# Patient Record
Sex: MALE | Race: WHITE | Employment: OTHER | URBAN - METROPOLITAN AREA
[De-identification: names, ages, dates, MRNs, and addresses within clinical notes are randomized per-mention and may not be internally consistent; named-entity substitution may affect disease eponyms.]

---

## 2019-03-12 ENCOUNTER — HOSPITAL ENCOUNTER (OUTPATIENT)
Dept: VASCULAR LAB | Age: 85
Discharge: HOME | End: 2019-03-12
Payer: MEDICARE

## 2019-03-12 PROCEDURE — 93975 VASCULAR STUDY: CPT

## 2019-05-09 ENCOUNTER — HOSPITAL ENCOUNTER (OUTPATIENT)
Dept: CT IMAGING | Age: 85
Discharge: HOME | End: 2019-05-09
Payer: MEDICARE

## 2019-05-09 DIAGNOSIS — I10 ESSENTIAL HYPERTENSION, MALIGNANT: ICD-10-CM

## 2019-05-09 PROCEDURE — 74150 CT ABDOMEN W/O CONTRAST: CPT

## 2019-06-01 ENCOUNTER — HOSPITAL ENCOUNTER (EMERGENCY)
Age: 85
Discharge: HOME | End: 2019-06-01
Attending: EMERGENCY MEDICINE
Payer: MEDICARE

## 2019-06-01 VITALS
WEIGHT: 178.13 LBS | OXYGEN SATURATION: 98 % | HEIGHT: 71 IN | DIASTOLIC BLOOD PRESSURE: 82 MMHG | RESPIRATION RATE: 18 BRPM | HEART RATE: 60 BPM | TEMPERATURE: 98 F | SYSTOLIC BLOOD PRESSURE: 136 MMHG | BODY MASS INDEX: 24.94 KG/M2

## 2019-06-01 DIAGNOSIS — E87.5 CHRONIC HYPERKALEMIA: Primary | ICD-10-CM

## 2019-06-01 LAB
A/G RATIO: 1.3 (ref 1.1–2.2)
ALBUMIN SERPL-MCNC: 3.7 G/DL (ref 3.4–5)
ALP BLD-CCNC: 46 U/L (ref 40–129)
ALT SERPL-CCNC: 25 U/L (ref 10–40)
ANION GAP SERPL CALCULATED.3IONS-SCNC: 9 MMOL/L (ref 3–16)
AST SERPL-CCNC: 18 U/L (ref 15–37)
BASE EXCESS ARTERIAL: -1.2 MMOL/L (ref -3–3)
BASOPHILS ABSOLUTE: 0 K/UL (ref 0–0.2)
BASOPHILS RELATIVE PERCENT: 0.5 %
BILIRUB SERPL-MCNC: 0.7 MG/DL (ref 0–1)
BUN BLDV-MCNC: 26 MG/DL (ref 7–20)
CALCIUM SERPL-MCNC: 9.8 MG/DL (ref 8.3–10.6)
CARBOXYHEMOGLOBIN ARTERIAL: 1.1 % (ref 0–1.5)
CHLORIDE BLD-SCNC: 103 MMOL/L (ref 99–110)
CO2: 23 MMOL/L (ref 21–32)
CREAT SERPL-MCNC: 1.6 MG/DL (ref 0.8–1.3)
EOSINOPHILS ABSOLUTE: 0.2 K/UL (ref 0–0.6)
EOSINOPHILS RELATIVE PERCENT: 2.5 %
GFR AFRICAN AMERICAN: 50
GFR NON-AFRICAN AMERICAN: 41
GLOBULIN: 2.8 G/DL
GLUCOSE BLD-MCNC: 99 MG/DL (ref 70–99)
HCO3 ARTERIAL: 20.9 MMOL/L (ref 21–29)
HCT VFR BLD CALC: 38.4 % (ref 40.5–52.5)
HEMOGLOBIN, ART, EXTENDED: 13.5 G/DL (ref 13.5–17.5)
HEMOGLOBIN: 13.1 G/DL (ref 13.5–17.5)
LYMPHOCYTES ABSOLUTE: 1.4 K/UL (ref 1–5.1)
LYMPHOCYTES RELATIVE PERCENT: 18.1 %
MCH RBC QN AUTO: 34.2 PG (ref 26–34)
MCHC RBC AUTO-ENTMCNC: 34 G/DL (ref 31–36)
MCV RBC AUTO: 100.6 FL (ref 80–100)
METHEMOGLOBIN ARTERIAL: 0.9 %
MONOCYTES ABSOLUTE: 0.5 K/UL (ref 0–1.3)
MONOCYTES RELATIVE PERCENT: 7.1 %
NEUTROPHILS ABSOLUTE: 5.4 K/UL (ref 1.7–7.7)
NEUTROPHILS RELATIVE PERCENT: 71.8 %
O2 CONTENT ARTERIAL: 19 ML/DL
O2 SAT, ARTERIAL: 98.7 %
O2 THERAPY: ABNORMAL
PCO2 ARTERIAL: 28.9 MMHG (ref 35–45)
PDW BLD-RTO: 13.7 % (ref 12.4–15.4)
PH ARTERIAL: 7.47 (ref 7.35–7.45)
PLATELET # BLD: 176 K/UL (ref 135–450)
PMV BLD AUTO: 6.6 FL (ref 5–10.5)
PO2 ARTERIAL: 104 MMHG (ref 75–108)
POTASSIUM REFLEX MAGNESIUM: 5.8 MMOL/L (ref 3.5–5.1)
RBC # BLD: 3.82 M/UL (ref 4.2–5.9)
SODIUM BLD-SCNC: 135 MMOL/L (ref 136–145)
TCO2 ARTERIAL: 21.8 MMOL/L
TOTAL PROTEIN: 6.5 G/DL (ref 6.4–8.2)
WBC # BLD: 7.5 K/UL (ref 4–11)

## 2019-06-01 PROCEDURE — 80053 COMPREHEN METABOLIC PANEL: CPT

## 2019-06-01 PROCEDURE — 93005 ELECTROCARDIOGRAM TRACING: CPT | Performed by: EMERGENCY MEDICINE

## 2019-06-01 PROCEDURE — 85025 COMPLETE CBC W/AUTO DIFF WBC: CPT

## 2019-06-01 PROCEDURE — 99283 EMERGENCY DEPT VISIT LOW MDM: CPT

## 2019-06-01 PROCEDURE — 94762 N-INVAS EAR/PLS OXIMTRY CONT: CPT

## 2019-06-01 PROCEDURE — 82803 BLOOD GASES ANY COMBINATION: CPT

## 2019-06-01 PROCEDURE — 36600 WITHDRAWAL OF ARTERIAL BLOOD: CPT

## 2019-06-01 PROCEDURE — 36415 COLL VENOUS BLD VENIPUNCTURE: CPT

## 2019-06-01 RX ORDER — SODIUM POLYSTYRENE SULFONATE 15 G/60ML
5 SUSPENSION ORAL; RECTAL DAILY
Qty: 140 ML | Refills: 0 | Status: SHIPPED | OUTPATIENT
Start: 2019-06-01 | End: 2019-06-08

## 2019-06-01 NOTE — ED NOTES
[facility]  Respiratory Therapy  Arterial Blood Gas    Name:  Naya Maradiaga  Medical Record Number:  [id number]  Age: 81 y.o.   : 1932  Today's Date:  2019  Room:  [address] [place] [address]      Assessment      BP (!) 148/74   Pulse 64   Temp 97.7 °F (36.5 °C) (Oral)   Resp 19   Ht 5' 11\" (1.803 m)   Wt 178 lb 2.1 oz (80.8 kg)   SpO2 98%   BMI 24.84 kg/m²     Patient Active Problem List   Diagnosis    Cellulitis    Near syncope    Left leg weakness    HTN (hypertension)       Social History  Social History     Tobacco Use    Smoking status: Never Smoker   Substance Use Topics    Alcohol use: No    Drug use: No         ABG drawn from right radial site. Current O2 settings: room air. An Michael's test was performed prior to draw and was positive for collateral blood flow. The draw site was then prepped with an alcohol pad and the specimen was collected on the first attempt. Pressure was held from the puncture site until any signs of bleeding had stopped and the site was bandaged with a bandaid. The specimen was then sent to the lab for analysis.         Patient/caregiver was educated on the proper method of use:  Yes      Level of patient/caregiver understanding able to:   [] Verbalize understanding   [] Demonstrate understanding       [] Teach back        [] Needs reinforcement        []  No available caregiver               []  Other:     Response to education:  Very Good     Teaching Time:  5  minutes     Electronically signed by Gisele Pantoja RCP on 2019 at 10:37 AM

## 2019-06-01 NOTE — ED TRIAGE NOTES
Pt to room 34 pt was seen by pcp and told to come in for high potassium level.  Pt has not complaints at this time pt pcp told him to stop taking spiractone and take a dose of lasix this am.

## 2019-06-01 NOTE — ED NOTES
D/C: Order noted for d/c. Pt confirmed d/c paperwork  have correct name. Discharge and education instructions reviewed with patient. Teach-back successful. Pt verbalized understanding and signed d/c papers. Pt denied questions at this time. No acute distress noted. Patient instructed to follow-up as noted - return to emergency department if symptoms worsen. Patient verbalized understanding. Discharged per EDMD with discharge instructions. Pt discharged per ambulation to private vehicle. Patient stable upon departure. Thanked patient for choosing [facility]) for care.           Ilene Brand RN  06/01/19 1200

## 2019-06-01 NOTE — ED PROVIDER NOTES
Emergency Physician Note    Chief Complaint  Other (pt sent by pcp for elevated potassium levels )       History of Present Illness  Sharon Meyer is a 81 y.o. male who presents to the ED for elevated potassium. Patient has chronic kidney disease is currently under the care of a nephrologist.  He had some labs done yesterday and it was indicated that it was higher than usual and he recommended that he go to the ER. At this time patient has no complaints. That he feels so good that he's been doing his own yard work recently. Patient was initially on spironolactone 50 mg twice a day, iit was recently stopped by his physician and he was started on  Lasix which she received his first dose this morning. Denies fever, chills, malaise, chest pain, shortness of breath, cough, abdominal pain, nausea, vomiting, diarrhea, headache, sore throat, dysuria, back pain, rash. No palliative/provocative factors. Unless otherwise stated in this report or unable to obtain because of the patient's clinical or mental status as evidenced by the medical record, this patient's positive and negative responses for review of systems, constitutional, psych, eyes, ENT, cardiovascular, respiratory, gastrointestinal, neurological, genitourinary, musculoskeletal, integument systems and systems related to the presenting problem are either stated in the preceding paragraph or were not pertinent or were negative for the symptoms and/or complaints related to the medical problem. I have reviewed the following from the nursing documentation:      Prior to Admission medications    Medication Sig Start Date End Date Taking? Authorizing Provider   furosemide (LASIX) 40 MG tablet Take 1 tablet by mouth daily For 7 days. Then take 1 tab by mouth once daily onwards.  8/4/15   Brooke Rhodes MD   amLODIPine [facility]) 5 MG tablet Take 1 tablet by mouth daily 8/4/15   Brooke Rhodes MD   ALPRAZolam [facility]) 0.5 MG tablet Take 0.5 mg by mouth 3 times daily    Historical Provider, MD   dicyclomine (BENTYL) 10 MG capsule Take 10 mg by mouth 3 times daily    Historical Provider, MD   sertraline (ZOLOFT) 100 MG tablet   Take 150 mg by mouth daily     Historical Provider, MD   finasteride (PROSCAR) 5 MG tablet Take 5 mg by mouth daily    Historical Provider, MD   oxybutynin (DITROPAN) 5 MG tablet Take 5 mg by mouth 3 times daily    Historical Provider, MD   benazepril (LOTENSIN) 40 MG tablet Take 40 mg by mouth daily    Historical Provider, MD   bisoprolol-hydrochlorothiazide (ZIAC) 10-6.25 MG per tablet Take 1 tablet by mouth daily    Historical Provider, MD   aspirin 81 MG tablet Take 81 mg by mouth daily    Historical Provider, MD   Multiple Vitamins-Minerals (CENTRUM SILVER ADULT 50+ PO) Take 1 tablet by mouth daily    Historical Provider, MD   loperamide (IMODIUM) 2 MG capsule Take 2 mg by mouth 4 times daily as needed for Diarrhea    Historical Provider, MD       Allergies as of 06/01/2019    (No Known Allergies)       Past Medical History:   Diagnosis Date    Anxiety     BPH (benign prostatic hyperplasia)     Chronic kidney disease     GERD (gastroesophageal reflux disease)     Hypertension     IBS (irritable bowel syndrome)     Psychiatric problem         Surgical History:   Past Surgical History:   Procedure Laterality Date    HERNIA REPAIR      TONSILLECTOMY          Family History:    Family History   Problem Relation Age of Onset    Heart Disease Mother     Heart Disease Father     Other Brother        Social History     Socioeconomic History    Marital status:       Spouse name: Not on file    Number of children: Not on file    Years of education: Not on file    Highest education level: Not on file   Occupational History    Not on file   Social Needs    Financial resource strain: Not on file    Food insecurity:     Worry: Not on file     Inability: Not on file    Transportation needs:     Medical: Not on file Non-medical: Not on file   Tobacco Use    Smoking status: Never Smoker   Substance and Sexual Activity    Alcohol use: No    Drug use: No    Sexual activity: Not on file   Lifestyle    Physical activity:     Days per week: Not on file     Minutes per session: Not on file    Stress: Not on file   Relationships    Social connections:     Talks on phone: Not on file     Gets together: Not on file     Attends [employer] service: Not on file     Active member of club or organization: Not on file     Attends meetings of clubs or organizations: Not on file     Relationship status: Not on file    Intimate partner violence:     Fear of current or ex partner: Not on file     Emotionally abused: Not on file     Physically abused: Not on file     Forced sexual activity: Not on file   Other Topics Concern    Not on file   Social History Narrative    Not on file       Nursing notes reviewed. ED Triage Vitals [06/01/19 0940]   Enc Vitals Group      BP (!) 148/74      Pulse 64      Resp 18      Temp 97.7 °F (36.5 °C)      Temp Source Oral      SpO2 100 %      Weight 178 lb 2.1 oz (80.8 kg)      Height 5' 11\" (1.803 m)      Head Circumference       Peak Flow       Pain Score       Pain Loc       Pain Edu? Excl. in [address]? GENERAL:  Awake, alert. Well developed, well nourished with no apparent distress. HENT:  Normocephalic, Atraumatic, no lacerations. EYES:  Conjunctiva normal, Pupils equal round and reactive to light, extraocular movements normal.  NECK:  Trachea is midline. No stridor. CHEST:  Regular rate and regular rhythm, no murmurs/rubs/gallops, normal S1/S2, chest wall non-tender. LUNGS:  No respiratory distress. No abdominal retractions, no sternal retractions. Clear to auscultation bilaterally, no wheezing, no rhochi, no rales  ABDOMEN:  Soft, non-tender, non-distended. No guarding and no rebound. No costovertebral angle tenderness to palpation.  Normal BS, no organomegaly, no abdominal masses  EXTREMITIES:  Normal range of motion, no edema, no tenderness, no deformity, distal pulses present and equal bilaterally. Moves extremities x4 with purpose. SKIN: Warm, dry and intact. NEUROLOGIC: Normal mental status. Moving all extremities to command. Alert and oriented x4 without focal deficit or gross sensory deficit. Normal speech. Strength 5/5 in bilateral upper and lower extremities. PSYCHIATRIC: Not anxious, normal mood and affect, thoughts are linear and organized, without delusions/hallucinations, responds appropriately to questions      LABS and DIAGNOSTIC RESULTS  EKG  The Ekg interpreted by me shows  normal sinus rhythm and and RBBB with a rate of 60  Axis is   Left axis deviation  QTc is  normal  Intervals and Durations are unremarkable. ST Segments: normal  Delta waves, Brugada Syndrome, and Short CO are not present. Prior EKG to compare with was available.  No significant changes compared to prior EKG from 31-JUL-2015    LABS  Results for orders placed or performed during the hospital encounter of 06/01/19   CBC Auto Differential   Result Value Ref Range    WBC 7.5 4.0 - 11.0 K/uL    RBC 3.82 (L) 4.20 - 5.90 M/uL    Hemoglobin 13.1 (L) 13.5 - 17.5 g/dL    Hematocrit 38.4 (L) 40.5 - 52.5 %    .6 (H) 80.0 - 100.0 fL    MCH 34.2 (H) 26.0 - 34.0 pg    MCHC 34.0 31.0 - 36.0 g/dL    RDW 13.7 12.4 - 15.4 %    Platelets 595 084 - 215 K/uL    MPV 6.6 5.0 - 10.5 fL    Neutrophils % 71.8 %    Lymphocytes % 18.1 %    Monocytes % 7.1 %    Eosinophils % 2.5 %    Basophils % 0.5 %    Neutrophils # 5.4 1.7 - 7.7 K/uL    Lymphocytes # 1.4 1.0 - 5.1 K/uL    Monocytes # 0.5 0.0 - 1.3 K/uL    Eosinophils # 0.2 0.0 - 0.6 K/uL    Basophils # 0.0 0.0 - 0.2 K/uL   Comprehensive Metabolic Panel w/ Reflex to MG   Result Value Ref Range    Sodium 135 (L) 136 - 145 mmol/L    Potassium reflex Magnesium 5.8 (H) 3.5 - 5.1 mmol/L    Chloride 103 99 - 110 mmol/L    CO2 23 21 - 32 mmol/L    Anion Gap 9 3 - 16    Glucose 99 70 - 99 mg/dL    BUN 26 (H) 7 - 20 mg/dL    CREATININE 1.6 (H) 0.8 - 1.3 mg/dL    GFR Non- 41 (A) >60    GFR  50 (A) >60    Calcium 9.8 8.3 - 10.6 mg/dL    Total Protein 6.5 6.4 - 8.2 g/dL    Alb 3.7 3.4 - 5.0 g/dL    Albumin/Globulin Ratio 1.3 1.1 - 2.2    Total Bilirubin 0.7 0.0 - 1.0 mg/dL    Alkaline Phosphatase 46 40 - 129 U/L    ALT 25 10 - 40 U/L    AST 18 15 - 37 U/L    Globulin 2.8 g/dL   Blood Gas, Arterial   Result Value Ref Range    pH, Arterial 7.473 (H) 7.350 - 7.450    pCO2, Arterial 28.9 (L) 35.0 - 45.0 mmHg    pO2, Arterial 104.0 75.0 - 108.0 mmHg    HCO3, Arterial 20.9 (L) 21.0 - 29.0 mmol/L    Base Excess, Arterial -1.2 -3.0 - 3.0 mmol/L    Hemoglobin, Art, Extended 13.5 13.5 - 17.5 g/dL    O2 Sat, Arterial 98.7 >92 %    Carboxyhgb, Arterial 1.1 0.0 - 1.5 %    Methemoglobin, Arterial 0.9 <1.5 %    TCO2, Arterial 21.8 Not Established mmol/L    O2 Content, Arterial 19 Not Established mL/dL    O2 Therapy See comment      I reviewed his previous potassium levels, On May 9  It was 5.6, yesterday was 6.5 today it is 5.8        PROCEDURES      MEDICAL DECISION MAKING    Procedures/interventions/images ordered for this visit  Orders Placed This Encounter   Procedures    CBC Auto Differential    Comprehensive Metabolic Panel w/ Reflex to MG    Blood Gas, Arterial    EKG 12 Lead    Saline lock IV    Insert peripheral IV       Medications ordered for this visit  No orders of the defined types were placed in this encounter. I discussed today's presentation patient and history with Dr. Clari Chaudhry, nephrologist, he recommended starting the patient on Kayexalate at 5 g daily and follow-up with his nephrologist on Monday. Patient daughter was in the room with him and took notes during my conversation with the patient and his daughter. This is a very pleasant patient who presents to the ER with abnormal lab values.   There are no focal findings on exam, they are afebrile, well appearing and have stable v/s. The exam and limited diagnostic studies performed in the emergency department today do not point towards acute coronary syndrome, pulmonary embolism, toxicity, shock, sepsis, unstable arrhythmia, hemodynamic or cardiopulmonary instability, severe anemia, or any disease process requiring other immediate medical or surgical intervention at this time. It is understood that close follow up with their physician is indicated and if they are not improving as expected or if other new symptoms or signs of concern develop, other etiologies or diagnoses may need to be considered requiring other tests, treatments, consultations, and/or admission. If any symptom should worsen or change then they are to return to the ER immediately. The diagnosis, plan, expected course, follow-up, and return precautions were discussed and all questions were answered. Final Impression    1. Chronic hyperkalemia        Blood pressure 136/82, pulse 60, temperature 98 °F (36.7 °C), temperature source Oral, resp. rate 18, height 5' 11\" (1.803 m), weight 178 lb 2.1 oz (80.8 kg), SpO2 98 %. Patient and/or companions verbalized understanding of the ED workup, any relevant findings as well as any incidental findings, and the disposition and plan. Questions sought and answered with the patient and/or family members. They voice understanding and agree with plan. If the patient was discharged from the ED, they were instructed to return for any worsening or worrisome concerns. Patient was given scripts for the following medications. I counseled patient how to take these medications. New Prescriptions    No medications on file       Disposition  Pt is in stable condition upon Discharge to home. The note was completed using Dragon voice recognition transcription.  Every effort was made to ensure accuracy; however, inadvertent transcription errors may be present despite my best efforts

## 2019-06-02 LAB
EKG ATRIAL RATE: 60 BPM
EKG DIAGNOSIS: NORMAL
EKG P AXIS: 82 DEGREES
EKG P-R INTERVAL: 208 MS
EKG Q-T INTERVAL: 440 MS
EKG QRS DURATION: 126 MS
EKG QTC CALCULATION (BAZETT): 440 MS
EKG R AXIS: -10 DEGREES
EKG T AXIS: 4 DEGREES
EKG VENTRICULAR RATE: 60 BPM

## 2019-06-02 PROCEDURE — 93010 ELECTROCARDIOGRAM REPORT: CPT | Performed by: INTERNAL MEDICINE

## 2024-12-02 ENCOUNTER — HOSPITAL ENCOUNTER (OUTPATIENT)
Dept: WOUND CARE | Age: 89
Discharge: HOME | End: 2024-12-02
Attending: NURSE PRACTITIONER
Payer: MEDICARE

## 2024-12-02 VITALS
HEIGHT: 71 IN | WEIGHT: 131.17 LBS | HEART RATE: 71 BPM | RESPIRATION RATE: 20 BRPM | SYSTOLIC BLOOD PRESSURE: 128 MMHG | BODY MASS INDEX: 18.36 KG/M2 | TEMPERATURE: 98.2 F | DIASTOLIC BLOOD PRESSURE: 70 MMHG

## 2024-12-02 DIAGNOSIS — L89.322 PRESSURE ULCER OF LEFT ISCHIUM, STAGE 2 (HCC): Primary | ICD-10-CM

## 2024-12-02 PROCEDURE — 99213 OFFICE O/P EST LOW 20 MIN: CPT

## 2024-12-02 PROCEDURE — 11042 DBRDMT SUBQ TIS 1ST 20SQCM/<: CPT | Performed by: NURSE PRACTITIONER

## 2024-12-02 PROCEDURE — 11042 DBRDMT SUBQ TIS 1ST 20SQCM/<: CPT

## 2024-12-02 RX ORDER — GENTAMICIN SULFATE 1 MG/G
OINTMENT TOPICAL ONCE
OUTPATIENT
Start: 2024-12-02 | End: 2024-12-02

## 2024-12-02 RX ORDER — LIDOCAINE HYDROCHLORIDE 40 MG/ML
SOLUTION TOPICAL ONCE
OUTPATIENT
Start: 2024-12-02 | End: 2024-12-02

## 2024-12-02 RX ORDER — BETAMETHASONE DIPROPIONATE 0.5 MG/G
CREAM TOPICAL ONCE
OUTPATIENT
Start: 2024-12-02 | End: 2024-12-02

## 2024-12-02 RX ORDER — MUPIROCIN 20 MG/G
OINTMENT TOPICAL ONCE
OUTPATIENT
Start: 2024-12-02 | End: 2024-12-02

## 2024-12-02 RX ORDER — GINSENG 100 MG
CAPSULE ORAL ONCE
OUTPATIENT
Start: 2024-12-02 | End: 2024-12-02

## 2024-12-02 RX ORDER — LIDOCAINE HYDROCHLORIDE 20 MG/ML
JELLY TOPICAL ONCE
OUTPATIENT
Start: 2024-12-02 | End: 2024-12-02

## 2024-12-02 RX ORDER — SODIUM CHLOR/HYPOCHLOROUS ACID 0.033 %
SOLUTION, IRRIGATION IRRIGATION ONCE
OUTPATIENT
Start: 2024-12-02 | End: 2024-12-02

## 2024-12-02 RX ORDER — LIDOCAINE HYDROCHLORIDE 40 MG/ML
SOLUTION TOPICAL ONCE
Status: DISCONTINUED | OUTPATIENT
Start: 2024-12-02 | End: 2024-12-03 | Stop reason: HOSPADM

## 2024-12-02 RX ORDER — LIDOCAINE 40 MG/G
CREAM TOPICAL ONCE
OUTPATIENT
Start: 2024-12-02 | End: 2024-12-02

## 2024-12-02 RX ORDER — LIDOCAINE 50 MG/G
OINTMENT TOPICAL ONCE
OUTPATIENT
Start: 2024-12-02 | End: 2024-12-02

## 2024-12-02 RX ORDER — SILVER SULFADIAZINE 10 MG/G
CREAM TOPICAL ONCE
OUTPATIENT
Start: 2024-12-02 | End: 2024-12-02

## 2024-12-02 RX ORDER — TRIAMCINOLONE ACETONIDE 1 MG/G
OINTMENT TOPICAL ONCE
OUTPATIENT
Start: 2024-12-02 | End: 2024-12-02

## 2024-12-02 RX ORDER — NEOMYCIN/BACITRACIN/POLYMYXINB 3.5-400-5K
OINTMENT (GRAM) TOPICAL ONCE
OUTPATIENT
Start: 2024-12-02 | End: 2024-12-02

## 2024-12-02 RX ORDER — CLOBETASOL PROPIONATE 0.5 MG/G
OINTMENT TOPICAL ONCE
OUTPATIENT
Start: 2024-12-02 | End: 2024-12-02

## 2024-12-02 RX ORDER — BACITRACIN ZINC AND POLYMYXIN B SULFATE 500; 1000 [USP'U]/G; [USP'U]/G
OINTMENT TOPICAL ONCE
OUTPATIENT
Start: 2024-12-02 | End: 2024-12-02

## 2024-12-02 RX ORDER — CARVEDILOL 6.25 MG/1
6.25 TABLET ORAL 2 TIMES DAILY WITH MEALS
COMMUNITY

## 2024-12-02 RX ORDER — PATIROMER 8.4 G/1
8.4 POWDER, FOR SUSPENSION ORAL DAILY
COMMUNITY

## 2024-12-02 RX ORDER — HYDROPHILIC CREAM
PASTE (GRAM) TOPICAL
Qty: 170 G | Refills: 1 | Status: SHIPPED | OUTPATIENT
Start: 2024-12-02

## 2024-12-02 ASSESSMENT — PAIN SCALES - GENERAL: PAINLEVEL_OUTOF10: 0

## 2024-12-02 NOTE — PATIENT INSTRUCTIONS
[facility] Wound Care Physician Orders and Discharge Instructions  [address], [address]        [place], [place] [address]  Telephone: [phone]      FAX [phone]  MONDAY - THURSDAY 8:30 am - 4:30 pm and Friday 8:30 am - 11:30 am      NAME:  Ran Solis  YOB: 1932  MEDICAL RECORD NUMBER:  [id number]  DATE:  12/2/2024      Return Appointment:  [x] Return Appointment: With Brandon Mitchell CNP  in  1  Week(s)             [] Nurse Visit for Wound Assessment:  [] DME/Wound Dressing Supplies Provided by:    Please call them directly to reorder supplies when you run out)  [] ECF or Home Healthcare:  Other: n/a  Your Home Care Agency is responsible to order your supplies.   [] Orders placed during your visit:       Memory Foam recommendations for bottoms wounds.   Memory Foam cushions 3-4 in thick. Sold at medical supply stores and online.   Range from $20- 45 Dollars    [employer] [facility] 1 Cushion with Higher Density for Additional Support, 18 Inch X 16 Inch X 4 Inch          Triad Cream: Sold Over the Counter and online    $15-30 Dollars     Triad is stocked at our local [facility] pharmacy   If looking for Zinc Oxide, it has several percentages, we recommended 20%- 40%    MAY PURCHASE OVER THE COUNTER DESITIN             Important Reminders:   Please wash hands with soap and water before and after every dressing change.  If you smoke we ask that you refrain from smoking. Smoking inhibits wounds from healing.  When taking antibiotics take the entire prescription as ordered. Do not stop taking until medication is all gone unless otherwise instructed.   Do not get wounds wet in the bath or shower unless otherwise instructed by your physician. If your wound is on your foot or leg, you may purchase a cast bag. Please ask at your local pharmacy.   IF YOU ARE UNABLE TO OBTAIN WOUND SUPPLIES, CONTINUE TO USE THE SUPPLIES YOU HAVE AVAILABLE UNTIL YOU ARE ABLE TO REACH US.

## 2024-12-03 ENCOUNTER — TELEPHONE (OUTPATIENT)
Dept: WOUND CARE | Age: 89
End: 2024-12-03

## 2024-12-03 NOTE — TELEPHONE ENCOUNTER
Daughter, Petty,  called about the current chair cushion/ wheel chair cushion they bough from [employer] to assist with padding harder surfaces when sitting. The gel one they bought, the patient doesn't like and daughter asking for recommendation. RN mentioned memory foam cushions 3-4 inch which she maybe able to price search online. RN stated there isn't a specific brand name to recommend since it is always per patient preference. 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 Petty understood and will research online and has no other questions.  Electronically signed by Dilma Rueda RN on 12/3/2024 at 3:52 PM

## 2024-12-03 NOTE — TELEPHONE ENCOUNTER
Petty, daughter called and stated she is having some issues at home with the directions for her father's wound. Patient has a wound to ischium, orders were to place triad cream to area twice daily and as needed. Petty stated over the night, the cream has wiped all off. This RN mentioned for Petty to reapply cream and in a thicker layer. She stated he went the bathroom 4 times in the night and it was all gone by morning. RN explained the importance to reapply and the importance to attempt to stay off wound. Petty stated he can't, he sits like this and that is how he sits. RN instructed even the slightest shift in position every hour should help, also the assistance with 3-4 in memory foam cushion will help,  since patient does not have much adipose tissue to his buttocks, and his bony prominences will tend to develop pressure ulcers when remaining in one position too often. Petty reiterated during this phone call that she wants to apply a silicone border over the cream to keep It in place, which had been addressed during the clinic visit yesterday. RN explained again the reasoning we did not use a border was because of his frequent incontinence episodes and that we worry the border will absorb stool or urine and keep it on the wound bed directly. RN stated that if this is something she wants to attempt, she can but each time it becomes soiled, it has to be changed. Petty asked I for clarification that if only the padding inside gets soiled it has to be changed. RN mentioned that the patient will develop more skin issues  and irritation and possibly break down if stool or urine remains on the skin even if it is on the edges of the bandage and that it would be more appropriate to change the dressing completely if she sees its soiled.  Petty verbalized understanding and has no other questions at this time. Electronically signed by Dilma Rueda RN on 12/3/2024 at 10:10 AM

## 2024-12-09 ENCOUNTER — HOSPITAL ENCOUNTER (OUTPATIENT)
Dept: WOUND CARE | Age: 89
Discharge: HOME | End: 2024-12-09
Attending: NURSE PRACTITIONER
Payer: MEDICARE

## 2024-12-09 VITALS
HEART RATE: 58 BPM | TEMPERATURE: 97.3 F | DIASTOLIC BLOOD PRESSURE: 52 MMHG | RESPIRATION RATE: 18 BRPM | SYSTOLIC BLOOD PRESSURE: 128 MMHG

## 2024-12-09 DIAGNOSIS — L89.322 PRESSURE ULCER OF LEFT ISCHIUM, STAGE 2 (HCC): Primary | ICD-10-CM

## 2024-12-09 PROCEDURE — 11042 DBRDMT SUBQ TIS 1ST 20SQCM/<: CPT

## 2024-12-09 PROCEDURE — 11042 DBRDMT SUBQ TIS 1ST 20SQCM/<: CPT | Performed by: NURSE PRACTITIONER

## 2024-12-09 RX ORDER — BETAMETHASONE DIPROPIONATE 0.5 MG/G
CREAM TOPICAL ONCE
OUTPATIENT
Start: 2024-12-09 | End: 2024-12-09

## 2024-12-09 RX ORDER — MUPIROCIN 20 MG/G
OINTMENT TOPICAL ONCE
OUTPATIENT
Start: 2024-12-09 | End: 2024-12-09

## 2024-12-09 RX ORDER — LIDOCAINE 40 MG/G
CREAM TOPICAL ONCE
OUTPATIENT
Start: 2024-12-09 | End: 2024-12-09

## 2024-12-09 RX ORDER — LIDOCAINE HYDROCHLORIDE 40 MG/ML
SOLUTION TOPICAL ONCE
Status: COMPLETED | OUTPATIENT
Start: 2024-12-09 | End: 2024-12-09

## 2024-12-09 RX ORDER — LIDOCAINE HYDROCHLORIDE 40 MG/ML
SOLUTION TOPICAL ONCE
OUTPATIENT
Start: 2024-12-09 | End: 2024-12-09

## 2024-12-09 RX ORDER — SODIUM CHLOR/HYPOCHLOROUS ACID 0.033 %
SOLUTION, IRRIGATION IRRIGATION ONCE
OUTPATIENT
Start: 2024-12-09 | End: 2024-12-09

## 2024-12-09 RX ORDER — BACITRACIN ZINC AND POLYMYXIN B SULFATE 500; 1000 [USP'U]/G; [USP'U]/G
OINTMENT TOPICAL ONCE
OUTPATIENT
Start: 2024-12-09 | End: 2024-12-09

## 2024-12-09 RX ORDER — CLOBETASOL PROPIONATE 0.5 MG/G
OINTMENT TOPICAL ONCE
OUTPATIENT
Start: 2024-12-09 | End: 2024-12-09

## 2024-12-09 RX ORDER — SILVER SULFADIAZINE 10 MG/G
CREAM TOPICAL ONCE
OUTPATIENT
Start: 2024-12-09 | End: 2024-12-09

## 2024-12-09 RX ORDER — LIDOCAINE HYDROCHLORIDE 20 MG/ML
JELLY TOPICAL ONCE
OUTPATIENT
Start: 2024-12-09 | End: 2024-12-09

## 2024-12-09 RX ORDER — NEOMYCIN/BACITRACIN/POLYMYXINB 3.5-400-5K
OINTMENT (GRAM) TOPICAL ONCE
OUTPATIENT
Start: 2024-12-09 | End: 2024-12-09

## 2024-12-09 RX ORDER — GINSENG 100 MG
CAPSULE ORAL ONCE
OUTPATIENT
Start: 2024-12-09 | End: 2024-12-09

## 2024-12-09 RX ORDER — TRIAMCINOLONE ACETONIDE 1 MG/G
OINTMENT TOPICAL ONCE
OUTPATIENT
Start: 2024-12-09 | End: 2024-12-09

## 2024-12-09 RX ORDER — GENTAMICIN SULFATE 1 MG/G
OINTMENT TOPICAL ONCE
OUTPATIENT
Start: 2024-12-09 | End: 2024-12-09

## 2024-12-09 RX ORDER — LIDOCAINE 50 MG/G
OINTMENT TOPICAL ONCE
OUTPATIENT
Start: 2024-12-09 | End: 2024-12-09

## 2024-12-09 RX ADMIN — LIDOCAINE HYDROCHLORIDE: 40 SOLUTION TOPICAL at 10:54

## 2024-12-09 NOTE — PATIENT INSTRUCTIONS
IT IS MOST IMPORTANT TO KEEP THE WOUND COVERED AT ALL TIMES.  ___________________________________________________________________    Wound Location: Left  ischium     PRIMARY DRESSING: TRIAD PASTE or Desitin at home, directly on to wound. Do not scrub clean. Can use mineral oil to remove excess paste.     SECONDARY DRESSING:  n/a    Dressing Frequency:Twice Daily and as OFTEN AS NEEDED   ___________________________________________________________________________________________  __________________________________________   Pressure Relief and Off Loading:  When sitting, shift position or do seat lifts every 15 minutes.  Wheelchair cushion  Turn every 2 hours when in bed.  Avoid position directing pressure on wound site.  Limit side lying to 30 degree tilt.  Limit HOB elevation to 30 degrees.  Off-loading when in bed  Off-loading when sitting    Specialty equipment ordered:         []Wheelchair cushion            [] Specialty Bed/Mattress  ______________________________________________________________________________________________                       [x]Activity: Activity as tolerated                    [x] Assistive Devices   rolling walker   Use as instructed by the provider     ____________________________________________________________________________________________     Dietary: Continue your diet as tolerated.  Important dietary reminders:  1. Increase Protein intake (i.e. Lean meats, fish, eggs, legumes, and yogurt).   2. Limit Sodium, Alcohol and Sugar.   3. If diabetic, follow a diabetic diet and check glucose prior to meals or as instructed by your physician.     May choose one of the Suggested Dietary Supplements below:   Trace       30ml ProStat  EnsureEnlive   Ensure Max/Premier  ____________________________________________________________________________________________  Pain:   Please Note : some pain, drainage and/or bleeding might be expected after seeing the provider.     If you are still

## 2024-12-09 NOTE — PROGRESS NOTES
Inch          Triad Cream: Sold Over the Counter and online    $15-30 Dollars     Triad is stocked at our local [facility] pharmacy   If looking for Zinc Oxide, it has several percentages, we recommended 20%- 40%    MAY PURCHASE OVER THE COUNTER DESITIN             Important Reminders:   Please wash hands with soap and water before and after every dressing change.  If you smoke we ask that you refrain from smoking. Smoking inhibits wounds from healing.  When taking antibiotics take the entire prescription as ordered. Do not stop taking until medication is all gone unless otherwise instructed.   Do not get wounds wet in the bath or shower unless otherwise instructed by your physician. If your wound is on your foot or leg, you may purchase a cast bag. Please ask at your local pharmacy.   IF YOU ARE UNABLE TO OBTAIN WOUND SUPPLIES, CONTINUE TO USE THE SUPPLIES YOU HAVE AVAILABLE UNTIL YOU ARE ABLE TO REACH US. IT IS MOST IMPORTANT TO KEEP THE WOUND COVERED AT ALL TIMES.  ___________________________________________________________________    Wound Location: Left  ischium     PRIMARY DRESSING: TRIAD PASTE or Desitin at home, directly on to wound. Do not scrub clean. Can use mineral oil to remove excess paste.     SECONDARY DRESSING:  n/a    Dressing Frequency:Twice Daily and as OFTEN AS NEEDED   ___________________________________________________________________________________________  __________________________________________   Pressure Relief and Off Loading:  When sitting, shift position or do seat lifts every 15 minutes.  Wheelchair cushion  Turn every 2 hours when in bed.  Avoid position directing pressure on wound site.  Limit side lying to 30 degree tilt.  Limit HOB elevation to 30 degrees.  Off-loading when in bed  Off-loading when sitting    Specialty equipment ordered:         []Wheelchair cushion            [] Specialty

## 2024-12-16 ENCOUNTER — APPOINTMENT (OUTPATIENT)
Dept: WOUND CARE | Age: 89
End: 2024-12-16
Attending: NURSE PRACTITIONER
Payer: MEDICARE

## 2024-12-19 ENCOUNTER — HOSPITAL ENCOUNTER (OUTPATIENT)
Dept: WOUND CARE | Age: 89
Discharge: HOME | End: 2024-12-19
Attending: NURSE PRACTITIONER
Payer: MEDICARE

## 2024-12-19 VITALS
TEMPERATURE: 97.3 F | SYSTOLIC BLOOD PRESSURE: 115 MMHG | HEART RATE: 70 BPM | RESPIRATION RATE: 18 BRPM | DIASTOLIC BLOOD PRESSURE: 48 MMHG

## 2024-12-19 DIAGNOSIS — L89.322 PRESSURE ULCER OF LEFT ISCHIUM, STAGE 2 (HCC): Primary | ICD-10-CM

## 2024-12-19 PROCEDURE — 11042 DBRDMT SUBQ TIS 1ST 20SQCM/<: CPT

## 2024-12-19 PROCEDURE — 11042 DBRDMT SUBQ TIS 1ST 20SQCM/<: CPT | Performed by: NURSE PRACTITIONER

## 2024-12-19 RX ORDER — LIDOCAINE 40 MG/G
CREAM TOPICAL ONCE
OUTPATIENT
Start: 2024-12-19 | End: 2024-12-19

## 2024-12-19 RX ORDER — BETAMETHASONE DIPROPIONATE 0.5 MG/G
CREAM TOPICAL ONCE
OUTPATIENT
Start: 2024-12-19 | End: 2024-12-19

## 2024-12-19 RX ORDER — GENTAMICIN SULFATE 1 MG/G
OINTMENT TOPICAL ONCE
OUTPATIENT
Start: 2024-12-19 | End: 2024-12-19

## 2024-12-19 RX ORDER — BACITRACIN ZINC AND POLYMYXIN B SULFATE 500; 1000 [USP'U]/G; [USP'U]/G
OINTMENT TOPICAL ONCE
OUTPATIENT
Start: 2024-12-19 | End: 2024-12-19

## 2024-12-19 RX ORDER — LIDOCAINE HYDROCHLORIDE 40 MG/ML
SOLUTION TOPICAL ONCE
OUTPATIENT
Start: 2024-12-19 | End: 2024-12-19

## 2024-12-19 RX ORDER — GINSENG 100 MG
CAPSULE ORAL ONCE
OUTPATIENT
Start: 2024-12-19 | End: 2024-12-19

## 2024-12-19 RX ORDER — LIDOCAINE HYDROCHLORIDE 20 MG/ML
JELLY TOPICAL ONCE
OUTPATIENT
Start: 2024-12-19 | End: 2024-12-19

## 2024-12-19 RX ORDER — SODIUM CHLOR/HYPOCHLOROUS ACID 0.033 %
SOLUTION, IRRIGATION IRRIGATION ONCE
OUTPATIENT
Start: 2024-12-19 | End: 2024-12-19

## 2024-12-19 RX ORDER — MUPIROCIN 20 MG/G
OINTMENT TOPICAL ONCE
OUTPATIENT
Start: 2024-12-19 | End: 2024-12-19

## 2024-12-19 RX ORDER — TRIAMCINOLONE ACETONIDE 1 MG/G
OINTMENT TOPICAL ONCE
OUTPATIENT
Start: 2024-12-19 | End: 2024-12-19

## 2024-12-19 RX ORDER — NEOMYCIN/BACITRACIN/POLYMYXINB 3.5-400-5K
OINTMENT (GRAM) TOPICAL ONCE
OUTPATIENT
Start: 2024-12-19 | End: 2024-12-19

## 2024-12-19 RX ORDER — LIDOCAINE HYDROCHLORIDE 40 MG/ML
SOLUTION TOPICAL ONCE
Status: COMPLETED | OUTPATIENT
Start: 2024-12-19 | End: 2024-12-19

## 2024-12-19 RX ORDER — CLOBETASOL PROPIONATE 0.5 MG/G
OINTMENT TOPICAL ONCE
OUTPATIENT
Start: 2024-12-19 | End: 2024-12-19

## 2024-12-19 RX ORDER — LIDOCAINE 50 MG/G
OINTMENT TOPICAL ONCE
OUTPATIENT
Start: 2024-12-19 | End: 2024-12-19

## 2024-12-19 RX ORDER — SILVER SULFADIAZINE 10 MG/G
CREAM TOPICAL ONCE
OUTPATIENT
Start: 2024-12-19 | End: 2024-12-19

## 2024-12-19 RX ADMIN — LIDOCAINE HYDROCHLORIDE: 40 SOLUTION TOPICAL at 10:12

## 2024-12-19 NOTE — PATIENT INSTRUCTIONS
[facility] Wound Care Physician Orders and Discharge Instructions  [address], [address]        [place], [place] [address]  Telephone: [phone]      FAX [phone]  MONDAY - THURSDAY 8:30 am - 4:30 pm and Friday 8:30 am - 11:30 am      NAME:  Ran Solis  YOB: 1932  MEDICAL RECORD NUMBER:  [id number]  DATE:  12/19/2024      Return Appointment:  [x] Return Appointment: With Brandon Mitchell CNP  in  2  Week(s)             [] Nurse Visit for Wound Assessment:  [] DME/Wound Dressing Supplies Provided by:    Please call them directly to reorder supplies when you run out)  [] ECF or Home Healthcare:  Other: n/a  Your Home Care Agency is responsible to order your supplies.   [] Orders placed during your visit:       Memory Foam recommendations for bottoms wounds.   Memory Foam cushions 3-4 in thick. Sold at medical supply stores and online.   Range from $20- 45 Dollars    [employer] [facility] 1 Cushion with Higher Density for Additional Support, 18 Inch X 16 Inch X 4 Inch          Triad Cream: Sold Over the Counter and online    $15-30 Dollars     Triad is stocked at our local [facility] pharmacy   If looking for Zinc Oxide, it has several percentages, we recommended 20%- 40%    MAY PURCHASE OVER THE COUNTER DESITIN             Important Reminders:   Please wash hands with soap and water before and after every dressing change.  If you smoke we ask that you refrain from smoking. Smoking inhibits wounds from healing.  When taking antibiotics take the entire prescription as ordered. Do not stop taking until medication is all gone unless otherwise instructed.   Do not get wounds wet in the bath or shower unless otherwise instructed by your physician. If your wound is on your foot or leg, you may purchase a cast bag. Please ask at your local pharmacy.   IF YOU ARE UNABLE TO OBTAIN WOUND SUPPLIES, CONTINUE TO USE THE SUPPLIES YOU HAVE AVAILABLE UNTIL YOU ARE ABLE TO REACH

## 2024-12-20 NOTE — PROGRESS NOTES
Friday 8:00 am - 11:30 am.   The office is closed on all major holidays.   (Hours of operation are subject to change).     Please give us 24-48 business hours to return your call.  If you need help with your wounds and cannot wait until we are available, contact your Primary Care Physician or go to your preferred emergency room.      Physician Signature:_______________________    Date: ___________ Time:  ____________    Brandon Mitchell CNP        Electronically signed by VALENTIN Pinto CNP on 12/19/2024 at 11:06 PM

## 2025-03-04 ENCOUNTER — HOSPITAL ENCOUNTER (INPATIENT)
Age: 89
LOS: 2 days | Discharge: HOSPICE | DRG: 689 | End: 2025-03-06
Attending: EMERGENCY MEDICINE | Admitting: HOSPITALIST
Payer: MEDICARE

## 2025-03-04 ENCOUNTER — APPOINTMENT (OUTPATIENT)
Dept: GENERAL RADIOLOGY | Age: 89
DRG: 689 | End: 2025-03-04
Payer: MEDICARE

## 2025-03-04 DIAGNOSIS — R41.0 CONFUSION: ICD-10-CM

## 2025-03-04 DIAGNOSIS — A41.9 SEPSIS, DUE TO UNSPECIFIED ORGANISM, UNSPECIFIED WHETHER ACUTE ORGAN DYSFUNCTION PRESENT (HCC): Primary | ICD-10-CM

## 2025-03-04 DIAGNOSIS — R53.1 GENERALIZED WEAKNESS: ICD-10-CM

## 2025-03-04 DIAGNOSIS — R29.6 MULTIPLE FALLS: ICD-10-CM

## 2025-03-04 DIAGNOSIS — R79.89 ELEVATED TROPONIN: ICD-10-CM

## 2025-03-04 DIAGNOSIS — R79.89 TROPONIN LEVEL ELEVATED: ICD-10-CM

## 2025-03-04 DIAGNOSIS — E86.0 DEHYDRATION: ICD-10-CM

## 2025-03-04 DIAGNOSIS — R79.89 ELEVATED BRAIN NATRIURETIC PEPTIDE (BNP) LEVEL: ICD-10-CM

## 2025-03-04 PROBLEM — R41.82 ALTERED MENTAL STATUS: Status: ACTIVE | Noted: 2025-03-04

## 2025-03-04 LAB
ALBUMIN SERPL-MCNC: 3.3 G/DL (ref 3.4–5)
ALBUMIN/GLOB SERPL: 1 {RATIO} (ref 1.1–2.2)
ALP SERPL-CCNC: 55 U/L (ref 40–129)
ALT SERPL-CCNC: 43 U/L (ref 10–40)
ANION GAP SERPL CALCULATED.3IONS-SCNC: 9 MMOL/L (ref 3–16)
AST SERPL-CCNC: 29 U/L (ref 15–37)
BACTERIA URNS QL MICRO: ABNORMAL /HPF
BASOPHILS # BLD: 0 K/UL (ref 0–0.2)
BASOPHILS NFR BLD: 0.7 %
BILIRUB SERPL-MCNC: 0.4 MG/DL (ref 0–1)
BILIRUB UR QL STRIP.AUTO: NEGATIVE
BUN SERPL-MCNC: 39 MG/DL (ref 7–20)
CALCIUM SERPL-MCNC: 11.7 MG/DL (ref 8.3–10.6)
CHARACTER UR: ABNORMAL
CHLORIDE SERPL-SCNC: 107 MMOL/L (ref 99–110)
CLARITY UR: CLEAR
CO2 SERPL-SCNC: 26 MMOL/L (ref 21–32)
COLOR UR: YELLOW
CREAT SERPL-MCNC: 1.4 MG/DL (ref 0.8–1.3)
CRYSTALS URNS MICRO: ABNORMAL /HPF
DEPRECATED RDW RBC AUTO: 13.4 % (ref 12.4–15.4)
EOSINOPHIL # BLD: 0.3 K/UL (ref 0–0.6)
EOSINOPHIL NFR BLD: 5.1 %
EPI CELLS #/AREA URNS HPF: ABNORMAL /HPF (ref 0–5)
GFR SERPLBLD CREATININE-BSD FMLA CKD-EPI: 47 ML/MIN/{1.73_M2}
GLUCOSE SERPL-MCNC: 83 MG/DL (ref 70–99)
GLUCOSE UR STRIP.AUTO-MCNC: NEGATIVE MG/DL
HCT VFR BLD AUTO: 34.2 % (ref 40.5–52.5)
HGB BLD-MCNC: 11.5 G/DL (ref 13.5–17.5)
HGB UR QL STRIP.AUTO: ABNORMAL
HYALINE CASTS #/AREA URNS LPF: ABNORMAL /LPF (ref 0–2)
KETONES UR STRIP.AUTO-MCNC: NEGATIVE MG/DL
LACTATE BLDV-SCNC: 0.6 MMOL/L (ref 0.4–1.9)
LEUKOCYTE ESTERASE UR QL STRIP.AUTO: ABNORMAL
LYMPHOCYTES # BLD: 0.4 K/UL (ref 1–5.1)
LYMPHOCYTES NFR BLD: 6.7 %
MCH RBC QN AUTO: 33.4 PG (ref 26–34)
MCHC RBC AUTO-ENTMCNC: 33.7 G/DL (ref 31–36)
MCV RBC AUTO: 99.2 FL (ref 80–100)
MONOCYTES # BLD: 0.4 K/UL (ref 0–1.3)
MONOCYTES NFR BLD: 5.9 %
NEUTROPHILS # BLD: 4.9 K/UL (ref 1.7–7.7)
NEUTROPHILS NFR BLD: 81.6 %
NITRITE UR QL STRIP.AUTO: POSITIVE
NT-PROBNP SERPL-MCNC: 881 PG/ML (ref 0–449)
PH UR STRIP.AUTO: 5 [PH] (ref 5–8)
PLATELET # BLD AUTO: 211 K/UL (ref 135–450)
PMV BLD AUTO: 6.6 FL (ref 5–10.5)
POTASSIUM SERPL-SCNC: 4.7 MMOL/L (ref 3.5–5.1)
PROCALCITONIN SERPL IA-MCNC: 0.07 NG/ML (ref 0–0.15)
PROT SERPL-MCNC: 6.6 G/DL (ref 6.4–8.2)
PROT UR STRIP.AUTO-MCNC: ABNORMAL MG/DL
RBC # BLD AUTO: 3.45 M/UL (ref 4.2–5.9)
RBC #/AREA URNS HPF: ABNORMAL /HPF (ref 0–4)
SARS-COV-2 RDRP RESP QL NAA+PROBE: NOT DETECTED
SODIUM SERPL-SCNC: 142 MMOL/L (ref 136–145)
SP GR UR STRIP.AUTO: 1.02 (ref 1–1.03)
TROPONIN, HIGH SENSITIVITY: 48 NG/L (ref 0–22)
TROPONIN, HIGH SENSITIVITY: 58 NG/L (ref 0–22)
UA COMPLETE W REFLEX CULTURE PNL UR: ABNORMAL
UA DIPSTICK W REFLEX MICRO PNL UR: YES
URN SPEC COLLECT METH UR: ABNORMAL
UROBILINOGEN UR STRIP-ACNC: 0.2 E.U./DL
WBC # BLD AUTO: 6 K/UL (ref 4–11)
WBC #/AREA URNS HPF: ABNORMAL /HPF (ref 0–5)

## 2025-03-04 PROCEDURE — 80053 COMPREHEN METABOLIC PANEL: CPT

## 2025-03-04 PROCEDURE — 36415 COLL VENOUS BLD VENIPUNCTURE: CPT

## 2025-03-04 PROCEDURE — 93005 ELECTROCARDIOGRAM TRACING: CPT | Performed by: EMERGENCY MEDICINE

## 2025-03-04 PROCEDURE — 2580000003 HC RX 258: Performed by: EMERGENCY MEDICINE

## 2025-03-04 PROCEDURE — 6370000000 HC RX 637 (ALT 250 FOR IP): Performed by: HOSPITALIST

## 2025-03-04 PROCEDURE — 84484 ASSAY OF TROPONIN QUANT: CPT

## 2025-03-04 PROCEDURE — 83880 ASSAY OF NATRIURETIC PEPTIDE: CPT

## 2025-03-04 PROCEDURE — 71045 X-RAY EXAM CHEST 1 VIEW: CPT

## 2025-03-04 PROCEDURE — 6360000002 HC RX W HCPCS: Performed by: HOSPITALIST

## 2025-03-04 PROCEDURE — 84145 PROCALCITONIN (PCT): CPT

## 2025-03-04 PROCEDURE — 99285 EMERGENCY DEPT VISIT HI MDM: CPT

## 2025-03-04 PROCEDURE — 83605 ASSAY OF LACTIC ACID: CPT

## 2025-03-04 PROCEDURE — 1200000000 HC SEMI PRIVATE

## 2025-03-04 PROCEDURE — 96361 HYDRATE IV INFUSION ADD-ON: CPT

## 2025-03-04 PROCEDURE — 6360000002 HC RX W HCPCS: Performed by: EMERGENCY MEDICINE

## 2025-03-04 PROCEDURE — 2500000003 HC RX 250 WO HCPCS: Performed by: HOSPITALIST

## 2025-03-04 PROCEDURE — 96374 THER/PROPH/DIAG INJ IV PUSH: CPT

## 2025-03-04 PROCEDURE — 87040 BLOOD CULTURE FOR BACTERIA: CPT

## 2025-03-04 PROCEDURE — 85025 COMPLETE CBC W/AUTO DIFF WBC: CPT

## 2025-03-04 PROCEDURE — 87502 INFLUENZA DNA AMP PROBE: CPT

## 2025-03-04 PROCEDURE — 81001 URINALYSIS AUTO W/SCOPE: CPT

## 2025-03-04 PROCEDURE — 87635 SARS-COV-2 COVID-19 AMP PRB: CPT

## 2025-03-04 PROCEDURE — 2500000003 HC RX 250 WO HCPCS: Performed by: EMERGENCY MEDICINE

## 2025-03-04 RX ORDER — FINASTERIDE 5 MG/1
5 TABLET, FILM COATED ORAL DAILY
Status: DISCONTINUED | OUTPATIENT
Start: 2025-03-05 | End: 2025-03-06 | Stop reason: HOSPADM

## 2025-03-04 RX ORDER — SODIUM CHLORIDE 0.9 % (FLUSH) 0.9 %
5-40 SYRINGE (ML) INJECTION EVERY 12 HOURS SCHEDULED
Status: DISCONTINUED | OUTPATIENT
Start: 2025-03-04 | End: 2025-03-06 | Stop reason: HOSPADM

## 2025-03-04 RX ORDER — OLANZAPINE 10 MG/2ML
5 INJECTION, POWDER, FOR SOLUTION INTRAMUSCULAR
Status: COMPLETED | OUTPATIENT
Start: 2025-03-04 | End: 2025-03-05

## 2025-03-04 RX ORDER — ASPIRIN 81 MG/1
81 TABLET, CHEWABLE ORAL DAILY
Status: DISCONTINUED | OUTPATIENT
Start: 2025-03-05 | End: 2025-03-06 | Stop reason: HOSPADM

## 2025-03-04 RX ORDER — MAGNESIUM SULFATE IN WATER 40 MG/ML
2000 INJECTION, SOLUTION INTRAVENOUS PRN
Status: DISCONTINUED | OUTPATIENT
Start: 2025-03-04 | End: 2025-03-04

## 2025-03-04 RX ORDER — 0.9 % SODIUM CHLORIDE 0.9 %
30 INTRAVENOUS SOLUTION INTRAVENOUS ONCE
Status: COMPLETED | OUTPATIENT
Start: 2025-03-04 | End: 2025-03-04

## 2025-03-04 RX ORDER — SERTRALINE HYDROCHLORIDE 25 MG/1
25 TABLET, FILM COATED ORAL
Status: DISCONTINUED | OUTPATIENT
Start: 2025-03-04 | End: 2025-03-06 | Stop reason: HOSPADM

## 2025-03-04 RX ORDER — ENOXAPARIN SODIUM 100 MG/ML
30 INJECTION SUBCUTANEOUS NIGHTLY
Status: DISCONTINUED | OUTPATIENT
Start: 2025-03-04 | End: 2025-03-06 | Stop reason: HOSPADM

## 2025-03-04 RX ORDER — ONDANSETRON 2 MG/ML
4 INJECTION INTRAMUSCULAR; INTRAVENOUS EVERY 6 HOURS PRN
Status: DISCONTINUED | OUTPATIENT
Start: 2025-03-04 | End: 2025-03-06 | Stop reason: HOSPADM

## 2025-03-04 RX ORDER — ONDANSETRON 4 MG/1
4 TABLET, ORALLY DISINTEGRATING ORAL EVERY 8 HOURS PRN
Status: DISCONTINUED | OUTPATIENT
Start: 2025-03-04 | End: 2025-03-06 | Stop reason: HOSPADM

## 2025-03-04 RX ORDER — POLYETHYLENE GLYCOL 3350 17 G/17G
17 POWDER, FOR SOLUTION ORAL DAILY PRN
Status: DISCONTINUED | OUTPATIENT
Start: 2025-03-04 | End: 2025-03-06 | Stop reason: HOSPADM

## 2025-03-04 RX ORDER — SODIUM CHLORIDE 9 MG/ML
INJECTION, SOLUTION INTRAVENOUS PRN
Status: DISCONTINUED | OUTPATIENT
Start: 2025-03-04 | End: 2025-03-06 | Stop reason: HOSPADM

## 2025-03-04 RX ORDER — POTASSIUM CHLORIDE 1500 MG/1
40 TABLET, EXTENDED RELEASE ORAL PRN
Status: DISCONTINUED | OUTPATIENT
Start: 2025-03-04 | End: 2025-03-04

## 2025-03-04 RX ORDER — BUSPIRONE HYDROCHLORIDE 10 MG/1
10 TABLET ORAL 3 TIMES DAILY
Status: DISCONTINUED | OUTPATIENT
Start: 2025-03-04 | End: 2025-03-06 | Stop reason: HOSPADM

## 2025-03-04 RX ORDER — CARVEDILOL 6.25 MG/1
6.25 TABLET ORAL 2 TIMES DAILY WITH MEALS
Status: DISCONTINUED | OUTPATIENT
Start: 2025-03-05 | End: 2025-03-06 | Stop reason: HOSPADM

## 2025-03-04 RX ORDER — ACETAMINOPHEN 650 MG/1
650 SUPPOSITORY RECTAL EVERY 6 HOURS PRN
Status: DISCONTINUED | OUTPATIENT
Start: 2025-03-04 | End: 2025-03-06 | Stop reason: HOSPADM

## 2025-03-04 RX ORDER — POTASSIUM CHLORIDE 7.45 MG/ML
10 INJECTION INTRAVENOUS PRN
Status: DISCONTINUED | OUTPATIENT
Start: 2025-03-04 | End: 2025-03-04

## 2025-03-04 RX ORDER — SERTRALINE HYDROCHLORIDE 100 MG/1
100 TABLET, FILM COATED ORAL DAILY
Status: DISCONTINUED | OUTPATIENT
Start: 2025-03-05 | End: 2025-03-06 | Stop reason: HOSPADM

## 2025-03-04 RX ORDER — SODIUM CHLORIDE 0.9 % (FLUSH) 0.9 %
5-40 SYRINGE (ML) INJECTION PRN
Status: DISCONTINUED | OUTPATIENT
Start: 2025-03-04 | End: 2025-03-06 | Stop reason: HOSPADM

## 2025-03-04 RX ORDER — ACETAMINOPHEN 325 MG/1
650 TABLET ORAL EVERY 6 HOURS PRN
Status: DISCONTINUED | OUTPATIENT
Start: 2025-03-04 | End: 2025-03-06 | Stop reason: HOSPADM

## 2025-03-04 RX ADMIN — SODIUM CHLORIDE, PRESERVATIVE FREE 10 ML: 5 INJECTION INTRAVENOUS at 22:16

## 2025-03-04 RX ADMIN — SODIUM CHLORIDE 1752 ML: 0.9 INJECTION, SOLUTION INTRAVENOUS at 16:52

## 2025-03-04 RX ADMIN — BUSPIRONE HYDROCHLORIDE 10 MG: 10 TABLET ORAL at 22:15

## 2025-03-04 RX ADMIN — WATER 1000 MG: 1 INJECTION INTRAMUSCULAR; INTRAVENOUS; SUBCUTANEOUS at 17:21

## 2025-03-04 RX ADMIN — ONDANSETRON 4 MG: 2 INJECTION, SOLUTION INTRAMUSCULAR; INTRAVENOUS at 22:16

## 2025-03-04 RX ADMIN — ACETAMINOPHEN 650 MG: 325 TABLET ORAL at 22:15

## 2025-03-04 RX ADMIN — SERTRALINE HYDROCHLORIDE 25 MG: 25 TABLET ORAL at 22:15

## 2025-03-04 ASSESSMENT — PAIN - FUNCTIONAL ASSESSMENT: PAIN_FUNCTIONAL_ASSESSMENT: NONE - DENIES PAIN

## 2025-03-04 ASSESSMENT — LIFESTYLE VARIABLES
HOW OFTEN DO YOU HAVE A DRINK CONTAINING ALCOHOL: NEVER
HOW MANY STANDARD DRINKS CONTAINING ALCOHOL DO YOU HAVE ON A TYPICAL DAY: PATIENT DOES NOT DRINK

## 2025-03-04 NOTE — ED TRIAGE NOTES
Pt into ER from home via [facility] with c/c confusion with weakness.  Pt fell a week ago.  Pt has episodes of confusion and weakness.  EMS found pt on the floor, where he had been for 2 hours due to pt being too weak to get up.  Pt A&O x1.

## 2025-03-04 NOTE — H&P
Hospital Medicine History & Physical      PCP: Umberto Glass MD    Date of Admission: 3/4/2025    Date of Service: Pt seen/examined on 3/4/25 and Admitted to Inpatient with expected LOS greater than two midnights due to medical therapy.     Chief Complaint: Falls, weakness, altered mental status      History Of Present Illness:      89 y.o. male with history of hypertension, CKD stage III, BPH, mood disorder presented to the emergency room with confusion, weakness and falls.  The patient was apparently found on the floor today by his daughter who called EMS, he was confused..  He has apparently been feeling generally weak and had a cough..  He had blood work in the ED and was sent home, he apparently had another fall after negative home.  He is alert and oriented self  Workup in the ED was significant for UTI  Rapid COVID was negative, rapid flu AMB are pending, chest x-ray was clear    Past Medical History:          Diagnosis Date    Anxiety     BPH (benign prostatic hyperplasia)     Chronic kidney disease     GERD (gastroesophageal reflux disease)     Hypertension     IBS (irritable bowel syndrome)     Psychiatric problem        Past Surgical History:          Procedure Laterality Date    HERNIA REPAIR      TONSILLECTOMY         Medications Prior to Admission:      Prior to Admission medications    Medication Sig Start Date End Date Taking? Authorizing Provider   carvedilol (COREG) 6.25 MG tablet Take 1 tablet by mouth 2 times daily (with meals)   Yes ProviderMary MD   spironolactone (ALDACTONE) 50 MG tablet Take 1 tablet by mouth daily 3/28/19  Yes Mary Hernandez MD   patiromer sorbitex calcium (VELTASSA) 8.4 g PACK packet Take 1 packet by mouth daily    Mary Hernandez MD   zinc oxide (TRIAD HYDROPHILIC) PSTE paste Apply to wounds twice daily 12/2/24   Brandon Mitchell APRN - CNP   hydrALAZINE (APRESOLINE) 25 MG tablet Take 3 tablets by mouth 3 times daily    Mary Hernandez

## 2025-03-04 NOTE — ED PROVIDER NOTES
[facility] EMERGENCY DEPARTMENT  EMERGENCY DEPARTMENT ENCOUNTER      Pt Name: Ran Solis  MRN: [id number]  Birthdate 7/18/1932  Date of evaluation: 3/4/2025  Provider: SRINIVAS KAHN DO    CHIEF COMPLAINT  Chief Complaint   Patient presents with    Altered Mental Status     confusion with weakness.  Pt fell a week ago.  Pt has episodes of confusion and weakness.  EMS found pt on the floor, where he had been for 2 hours due to pt being too weak to get up.  Pt A&O x1.           This patient is at risk for a communicable infection.  Therefore, personal protection equipment consisting of a mask was worn for the exam.    HPI  Ran Solis is a 89 y.o. male who presents with generalized weakness.  He was found on the floor today by his daughter and she called EMS.  He was confused which is unusual for him.  He has been coughing and has been weak.  He had blood work today and he went home and then after he arrived home he was found on the floor.  He states he was too weak to get up to go to the bathroom.  He is alert and oriented x 1 and is a poor historian..    REVIEW OF SYSTEMS  All systems negative except as noted in the HPI.  Reviewed Nurses' notes and concur.   History limited due to patient condition.    No LMP for male patient.    PAST MEDICAL HISTORY  Past Medical History:   Diagnosis Date    Anxiety     BPH (benign prostatic hyperplasia)     Chronic kidney disease     GERD (gastroesophageal reflux disease)     Hypertension     IBS (irritable bowel syndrome)     Psychiatric problem        FAMILY HISTORY  Family History   Problem Relation Age of Onset    Heart Disease Mother     Heart Disease Father     Other Brother        SOCIAL HISTORY   reports that he has never smoked. He does not have any smokeless tobacco history on file. He reports that he does not drink alcohol and does not use drugs.    SURGICAL HISTORY  Past Surgical History:   Procedure Laterality Date    HERNIA REPAIR

## 2025-03-05 ENCOUNTER — APPOINTMENT (OUTPATIENT)
Dept: GENERAL RADIOLOGY | Age: 89
DRG: 689 | End: 2025-03-05
Payer: MEDICARE

## 2025-03-05 LAB
ANION GAP SERPL CALCULATED.3IONS-SCNC: 7 MMOL/L (ref 3–16)
BASOPHILS # BLD: 0 K/UL (ref 0–0.2)
BASOPHILS NFR BLD: 0.6 %
BUN SERPL-MCNC: 36 MG/DL (ref 7–20)
CALCIUM SERPL-MCNC: 10 MG/DL (ref 8.3–10.6)
CHLORIDE SERPL-SCNC: 110 MMOL/L (ref 99–110)
CO2 SERPL-SCNC: 23 MMOL/L (ref 21–32)
CREAT SERPL-MCNC: 1.3 MG/DL (ref 0.8–1.3)
DEPRECATED RDW RBC AUTO: 13.2 % (ref 12.4–15.4)
EKG ATRIAL RATE: 67 BPM
EKG DIAGNOSIS: NORMAL
EKG P AXIS: 79 DEGREES
EKG P-R INTERVAL: 174 MS
EKG Q-T INTERVAL: 412 MS
EKG QRS DURATION: 134 MS
EKG QTC CALCULATION (BAZETT): 435 MS
EKG R AXIS: 35 DEGREES
EKG T AXIS: 58 DEGREES
EKG VENTRICULAR RATE: 67 BPM
EOSINOPHIL # BLD: 0.1 K/UL (ref 0–0.6)
EOSINOPHIL NFR BLD: 1.2 %
FLUAV + FLUBV AG NOSE IA.RAPID: NOT DETECTED
FLUAV + FLUBV AG NOSE IA.RAPID: NOT DETECTED
GFR SERPLBLD CREATININE-BSD FMLA CKD-EPI: 52 ML/MIN/{1.73_M2}
GLUCOSE SERPL-MCNC: 74 MG/DL (ref 70–99)
HCT VFR BLD AUTO: 27.8 % (ref 40.5–52.5)
HGB BLD-MCNC: 9.6 G/DL (ref 13.5–17.5)
LYMPHOCYTES # BLD: 0.3 K/UL (ref 1–5.1)
LYMPHOCYTES NFR BLD: 3.4 %
MCH RBC QN AUTO: 33.9 PG (ref 26–34)
MCHC RBC AUTO-ENTMCNC: 34.5 G/DL (ref 31–36)
MCV RBC AUTO: 98.1 FL (ref 80–100)
MONOCYTES # BLD: 0.4 K/UL (ref 0–1.3)
MONOCYTES NFR BLD: 5 %
NEUTROPHILS # BLD: 6.9 K/UL (ref 1.7–7.7)
NEUTROPHILS NFR BLD: 89.8 %
PLATELET # BLD AUTO: 166 K/UL (ref 135–450)
PMV BLD AUTO: 7.4 FL (ref 5–10.5)
POTASSIUM SERPL-SCNC: 4.6 MMOL/L (ref 3.5–5.1)
RBC # BLD AUTO: 2.83 M/UL (ref 4.2–5.9)
SODIUM SERPL-SCNC: 140 MMOL/L (ref 136–145)
WBC # BLD AUTO: 7.7 K/UL (ref 4–11)

## 2025-03-05 PROCEDURE — 2500000003 HC RX 250 WO HCPCS

## 2025-03-05 PROCEDURE — 92610 EVALUATE SWALLOWING FUNCTION: CPT

## 2025-03-05 PROCEDURE — 92611 MOTION FLUOROSCOPY/SWALLOW: CPT

## 2025-03-05 PROCEDURE — 6370000000 HC RX 637 (ALT 250 FOR IP): Performed by: HOSPITALIST

## 2025-03-05 PROCEDURE — 97535 SELF CARE MNGMENT TRAINING: CPT

## 2025-03-05 PROCEDURE — 6360000002 HC RX W HCPCS

## 2025-03-05 PROCEDURE — 2500000003 HC RX 250 WO HCPCS: Performed by: HOSPITALIST

## 2025-03-05 PROCEDURE — 80048 BASIC METABOLIC PNL TOTAL CA: CPT

## 2025-03-05 PROCEDURE — 85025 COMPLETE CBC W/AUTO DIFF WBC: CPT

## 2025-03-05 PROCEDURE — 97550 CAREGIVER TRAING 1ST 30 MIN: CPT

## 2025-03-05 PROCEDURE — 36415 COLL VENOUS BLD VENIPUNCTURE: CPT

## 2025-03-05 PROCEDURE — 97166 OT EVAL MOD COMPLEX 45 MIN: CPT

## 2025-03-05 PROCEDURE — 74230 X-RAY XM SWLNG FUNCJ C+: CPT

## 2025-03-05 PROCEDURE — 1200000000 HC SEMI PRIVATE

## 2025-03-05 PROCEDURE — 97530 THERAPEUTIC ACTIVITIES: CPT

## 2025-03-05 PROCEDURE — 97162 PT EVAL MOD COMPLEX 30 MIN: CPT

## 2025-03-05 PROCEDURE — 94760 N-INVAS EAR/PLS OXIMETRY 1: CPT

## 2025-03-05 PROCEDURE — 6360000002 HC RX W HCPCS: Performed by: HOSPITALIST

## 2025-03-05 PROCEDURE — 92526 ORAL FUNCTION THERAPY: CPT

## 2025-03-05 RX ORDER — LEVOFLOXACIN 250 MG/1
250 TABLET, FILM COATED ORAL EVERY 24 HOURS
Status: DISCONTINUED | OUTPATIENT
Start: 2025-03-05 | End: 2025-03-06 | Stop reason: HOSPADM

## 2025-03-05 RX ORDER — WATER 10 ML/10ML
INJECTION INTRAMUSCULAR; INTRAVENOUS; SUBCUTANEOUS
Status: COMPLETED
Start: 2025-03-05 | End: 2025-03-05

## 2025-03-05 RX ADMIN — FINASTERIDE 5 MG: 5 TABLET, FILM COATED ORAL at 09:36

## 2025-03-05 RX ADMIN — SERTRALINE HYDROCHLORIDE 25 MG: 25 TABLET ORAL at 17:31

## 2025-03-05 RX ADMIN — BUSPIRONE HYDROCHLORIDE 10 MG: 10 TABLET ORAL at 21:41

## 2025-03-05 RX ADMIN — ACETAMINOPHEN 650 MG: 325 TABLET ORAL at 21:38

## 2025-03-05 RX ADMIN — SODIUM CHLORIDE, PRESERVATIVE FREE 10 ML: 5 INJECTION INTRAVENOUS at 09:42

## 2025-03-05 RX ADMIN — ASPIRIN 81 MG: 81 TABLET, CHEWABLE ORAL at 09:36

## 2025-03-05 RX ADMIN — BUSPIRONE HYDROCHLORIDE 10 MG: 10 TABLET ORAL at 09:36

## 2025-03-05 RX ADMIN — LEVOFLOXACIN 250 MG: 250 TABLET, FILM COATED ORAL at 21:41

## 2025-03-05 RX ADMIN — SERTRALINE 100 MG: 100 TABLET, FILM COATED ORAL at 09:36

## 2025-03-05 RX ADMIN — OLANZAPINE 5 MG: 10 INJECTION, POWDER, FOR SOLUTION INTRAMUSCULAR at 02:57

## 2025-03-05 RX ADMIN — CARVEDILOL 6.25 MG: 6.25 TABLET, FILM COATED ORAL at 17:31

## 2025-03-05 RX ADMIN — Medication 3 MG: at 21:41

## 2025-03-05 RX ADMIN — WATER 2.1 ML: 1 INJECTION INTRAMUSCULAR; INTRAVENOUS; SUBCUTANEOUS at 02:58

## 2025-03-05 RX ADMIN — CARVEDILOL 6.25 MG: 6.25 TABLET, FILM COATED ORAL at 09:36

## 2025-03-05 RX ADMIN — ENOXAPARIN SODIUM 30 MG: 100 INJECTION SUBCUTANEOUS at 21:40

## 2025-03-05 RX ADMIN — BUSPIRONE HYDROCHLORIDE 10 MG: 10 TABLET ORAL at 14:45

## 2025-03-05 NOTE — PROCEDURES
Facility/Department: [address] MED SURG  MODIFIED BARIUM SWALLOW EVALUATION    Patient: Ran Solis   : 1932   MRN: [id number]      Evaluation Date: 3/5/2025   Ordering MD: Zaria  Radiologist: James  SLP:  MELQUIADES Moran     Admitting Diagnosis: Dehydration [E86.0]  Confusion [R41.0]  Altered mental status [R41.82]  Generalized weakness [R53.1]  Elevated troponin [R79.89]  Elevated brain natriuretic peptide (BNP) level [R79.89]  Multiple falls [R29.6]  Sepsis, due to unspecified organism, unspecified whether acute organ dysfunction present (HCC) [A41.9]   has a past medical history of Anxiety, BPH (benign prostatic hyperplasia), Chronic kidney disease, GERD (gastroesophageal reflux disease), Hypertension, IBS (irritable bowel syndrome), and Psychiatric problem.   has a past surgical history that includes Tonsillectomy and hernia repair.  Allergies: NKA  Dysphagia History including instrumental assessment: none on record  GI history: h/o GERD and IBS  Baseline/Prior Level of Function: Living Status: admitted from home with daughter; Baseline diet: regular/thin     Date of Onset: 3/4/2025    Treatment Diagnosis: Dysphagia   Type of Study: Modified Barium Swallowing Study (MBS)  Diet Prior to Study: Regular texture, Mildly/Nectar Thick liquids   , TBD pending ongoing assessment    Reason for MBSS: further assess oropharyngeal dysphagia  Pt complaint: reports coughing with PO at times   Staff / family report: describes concern for coughing with liquids frequently with report for increased coughing with big gulps   Recent KEAGAN and recommendations: rec for MBS for further assessment    Pertinent diagnostics/medical course:   3/4/2025 admitted with c/o falls, weakness, AMS; UTI.    3/4/2025 CXR: IMPRESSION: No acute process.      ASSESSMENT / IMPRESSIONS     MBSS Assessment Impression:  Behavioral/ cognitive/communication: oriented to self and place type, alert, cooperative, verbally responsive, follows one step

## 2025-03-05 NOTE — CONSULTS
family  Code status clarified: DNRCC  Palliative care orders introduced  Provided information about hospice    Discharge Environment:  [x] Hospice Consult Agency: List provided chose [facility]   [x] Inpatient Hospice vs   [x] Home with Hospice Care     Discussion: Patient from home after daughter found him on floor, had UTI, PMH HTN, CKD3, BPH, anxiety, GERD, frequent falls.  Found to have UTI in ED, had MBS today did not go well. I met with patient and his daughter, Petty Solis, he slept through meeting. His daughter gives a history of him declining last few months, he is sleeping more, eating less, last couple of weeks reaching up in air. His daughter was present for MBS and saw that his swallowing was not great, this confirmed what she had been seeing at home. She knows he would not want feeding tube. We have discussed options and she agreed to meet with hospice tomorrow to see if they can help her care for him at home.   All questions asked and answered.  Dr Enciso informed of above  Referral faxed to [facility]    I will continue to follow Mr. Solis's care as needed.      Thank you for allowing me to participate in the care of Mr. Solis .     Electronically signed by Venessa Medina RN, BSN, CHPN on 3/5/2025 at 5:02 PM  Palliative Care Nurse [facility]  Office: [phone]

## 2025-03-06 VITALS
HEART RATE: 66 BPM | OXYGEN SATURATION: 95 % | WEIGHT: 124.12 LBS | HEIGHT: 71 IN | RESPIRATION RATE: 18 BRPM | BODY MASS INDEX: 17.38 KG/M2 | SYSTOLIC BLOOD PRESSURE: 112 MMHG | DIASTOLIC BLOOD PRESSURE: 51 MMHG | TEMPERATURE: 98.8 F

## 2025-03-06 LAB
ANION GAP SERPL CALCULATED.3IONS-SCNC: 6 MMOL/L (ref 3–16)
BASOPHILS # BLD: 0 K/UL (ref 0–0.2)
BASOPHILS NFR BLD: 0.9 %
BUN SERPL-MCNC: 31 MG/DL (ref 7–20)
CALCIUM SERPL-MCNC: 10.3 MG/DL (ref 8.3–10.6)
CHLORIDE SERPL-SCNC: 109 MMOL/L (ref 99–110)
CO2 SERPL-SCNC: 26 MMOL/L (ref 21–32)
CREAT SERPL-MCNC: 1.3 MG/DL (ref 0.8–1.3)
DEPRECATED RDW RBC AUTO: 13.3 % (ref 12.4–15.4)
EOSINOPHIL # BLD: 0.3 K/UL (ref 0–0.6)
EOSINOPHIL NFR BLD: 5.3 %
GFR SERPLBLD CREATININE-BSD FMLA CKD-EPI: 52 ML/MIN/{1.73_M2}
GLUCOSE SERPL-MCNC: 81 MG/DL (ref 70–99)
HCT VFR BLD AUTO: 30.9 % (ref 40.5–52.5)
HGB BLD-MCNC: 10.4 G/DL (ref 13.5–17.5)
LYMPHOCYTES # BLD: 0.5 K/UL (ref 1–5.1)
LYMPHOCYTES NFR BLD: 9.2 %
MCH RBC QN AUTO: 33.2 PG (ref 26–34)
MCHC RBC AUTO-ENTMCNC: 33.7 G/DL (ref 31–36)
MCV RBC AUTO: 98.5 FL (ref 80–100)
MONOCYTES # BLD: 0.4 K/UL (ref 0–1.3)
MONOCYTES NFR BLD: 6.8 %
NEUTROPHILS # BLD: 4.1 K/UL (ref 1.7–7.7)
NEUTROPHILS NFR BLD: 77.8 %
PLATELET # BLD AUTO: 175 K/UL (ref 135–450)
PMV BLD AUTO: 6.4 FL (ref 5–10.5)
POTASSIUM SERPL-SCNC: 4.2 MMOL/L (ref 3.5–5.1)
RBC # BLD AUTO: 3.14 M/UL (ref 4.2–5.9)
SODIUM SERPL-SCNC: 141 MMOL/L (ref 136–145)
WBC # BLD AUTO: 5.3 K/UL (ref 4–11)

## 2025-03-06 PROCEDURE — 85025 COMPLETE CBC W/AUTO DIFF WBC: CPT

## 2025-03-06 PROCEDURE — 36415 COLL VENOUS BLD VENIPUNCTURE: CPT

## 2025-03-06 PROCEDURE — 80048 BASIC METABOLIC PNL TOTAL CA: CPT

## 2025-03-06 PROCEDURE — 6360000002 HC RX W HCPCS: Performed by: NURSE PRACTITIONER

## 2025-03-06 PROCEDURE — 6370000000 HC RX 637 (ALT 250 FOR IP): Performed by: HOSPITALIST

## 2025-03-06 PROCEDURE — 94760 N-INVAS EAR/PLS OXIMETRY 1: CPT

## 2025-03-06 RX ORDER — WATER 10 ML/10ML
INJECTION INTRAMUSCULAR; INTRAVENOUS; SUBCUTANEOUS
Status: DISPENSED
Start: 2025-03-06 | End: 2025-03-06

## 2025-03-06 RX ORDER — OLANZAPINE 10 MG/2ML
5 INJECTION, POWDER, FOR SOLUTION INTRAMUSCULAR ONCE
Status: COMPLETED | OUTPATIENT
Start: 2025-03-06 | End: 2025-03-06

## 2025-03-06 RX ORDER — OLANZAPINE 10 MG/2ML
2.5 INJECTION, POWDER, FOR SOLUTION INTRAMUSCULAR ONCE
Status: DISCONTINUED | OUTPATIENT
Start: 2025-03-06 | End: 2025-03-06

## 2025-03-06 RX ORDER — LEVOFLOXACIN 250 MG/1
250 TABLET, FILM COATED ORAL EVERY 24 HOURS
DISCHARGE
Start: 2025-03-07 | End: 2025-03-11

## 2025-03-06 RX ADMIN — OLANZAPINE 5 MG: 10 INJECTION, POWDER, FOR SOLUTION INTRAMUSCULAR at 05:23

## 2025-03-06 RX ADMIN — CARVEDILOL 6.25 MG: 6.25 TABLET, FILM COATED ORAL at 17:27

## 2025-03-06 RX ADMIN — SERTRALINE 100 MG: 100 TABLET, FILM COATED ORAL at 08:09

## 2025-03-06 RX ADMIN — BUSPIRONE HYDROCHLORIDE 10 MG: 10 TABLET ORAL at 13:30

## 2025-03-06 RX ADMIN — BUSPIRONE HYDROCHLORIDE 10 MG: 10 TABLET ORAL at 08:09

## 2025-03-06 RX ADMIN — LEVOFLOXACIN 250 MG: 250 TABLET, FILM COATED ORAL at 17:27

## 2025-03-06 RX ADMIN — ASPIRIN 81 MG: 81 TABLET, CHEWABLE ORAL at 08:09

## 2025-03-06 RX ADMIN — SERTRALINE HYDROCHLORIDE 25 MG: 25 TABLET ORAL at 17:27

## 2025-03-06 RX ADMIN — FINASTERIDE 5 MG: 5 TABLET, FILM COATED ORAL at 08:09

## 2025-03-06 NOTE — PLAN OF CARE
Problem: Discharge Planning  Goal: Discharge to home or other facility with appropriate resources  3/5/2025 1036 by Allison Nuñez RN  Outcome: Progressing  3/5/2025 0332 by Lilliana Gann RN  Outcome: Progressing  Flowsheets (Taken 3/4/2025 1956 by Funmi Larry, RN)  Discharge to home or other facility with appropriate resources:   Identify barriers to discharge with patient and caregiver   Identify discharge learning needs (meds, wound care, etc)   Refer to discharge planning if patient needs post-hospital services based on physician order or complex needs related to functional status, cognitive ability or social support system   Arrange for needed discharge resources and transportation as appropriate     Problem: Safety - Adult  Goal: Free from fall injury  3/5/2025 1036 by Allison Nuñez RN  Outcome: Progressing  3/5/2025 0332 by Lilliana Gann RN  Outcome: Progressing     Problem: ABCDS Injury Assessment  Goal: Absence of physical injury  3/5/2025 1036 by Allison Nuñez RN  Outcome: Progressing  3/5/2025 0332 by Lilliana Gann RN  Outcome: Progressing     Problem: Skin/Tissue Integrity  Goal: Skin integrity remains intact  Description: 1.  Monitor for areas of redness and/or skin breakdown  2.  Assess vascular access sites hourly  3.  Every 4-6 hours minimum:  Change oxygen saturation probe site  4.  Every 4-6 hours:  If on nasal continuous positive airway pressure, respiratory therapy assess nares and determine need for appliance change or resting period  3/5/2025 1036 by Allison Nuñez RN  Outcome: Progressing  3/5/2025 0332 by Lilliana Gann RN  Outcome: Progressing     Problem: Confusion  Goal: Confusion, delirium, dementia, or psychosis is improved or at baseline  Description: INTERVENTIONS:  1. Assess for possible contributors to thought disturbance, including medications, impaired vision or hearing, underlying metabolic abnormalities, dehydration, psychiatric 
  Problem: Discharge Planning  Goal: Discharge to home or other facility with appropriate resources  Outcome: Progressing     Problem: Safety - Adult  Goal: Free from fall injury  Outcome: Progressing     Problem: ABCDS Injury Assessment  Goal: Absence of physical injury  Outcome: Progressing     Problem: Skin/Tissue Integrity  Goal: Skin integrity remains intact  Description: 1.  Monitor for areas of redness and/or skin breakdown  2.  Assess vascular access sites hourly  3.  Every 4-6 hours minimum:  Change oxygen saturation probe site  4.  Every 4-6 hours:  If on nasal continuous positive airway pressure, respiratory therapy assess nares and determine need for appliance change or resting period  Outcome: Progressing     Problem: Confusion  Goal: Confusion, delirium, dementia, or psychosis is improved or at baseline  Description: INTERVENTIONS:  1. Assess for possible contributors to thought disturbance, including medications, impaired vision or hearing, underlying metabolic abnormalities, dehydration, psychiatric diagnoses, and notify attending LIP  2. [place] high risk fall precautions, as indicated  3. Provide frequent short contacts to provide reality reorientation, refocusing and direction  4. Decrease environmental stimuli, including noise as appropriate  5. Monitor and intervene to maintain adequate nutrition, hydration, elimination, sleep and activity  6. If unable to ensure safety without constant attention obtain sitter and review sitter guidelines with assigned personnel  7. Initiate Psychosocial CNS and Spiritual Care consult, as indicated  Outcome: Progressing     Problem: Neurosensory - Adult  Goal: Achieves stable or improved neurological status  Outcome: Progressing  Goal: Achieves maximal functionality and self care  Outcome: Progressing     Problem: Respiratory - Adult  Goal: Achieves optimal ventilation and oxygenation  Outcome: Progressing     Problem: Cardiovascular - Adult  Goal: Maintains 
  Problem: Discharge Planning  Goal: Discharge to home or other facility with appropriate resources  Outcome: Progressing  Flowsheets (Taken 3/4/2025 1956 by Funmi Larry, RN)  Discharge to home or other facility with appropriate resources:   Identify barriers to discharge with patient and caregiver   Identify discharge learning needs (meds, wound care, etc)   Refer to discharge planning if patient needs post-hospital services based on physician order or complex needs related to functional status, cognitive ability or social support system   Arrange for needed discharge resources and transportation as appropriate     Problem: Safety - Adult  Goal: Free from fall injury  Outcome: Progressing     Problem: ABCDS Injury Assessment  Goal: Absence of physical injury  Outcome: Progressing     Problem: Skin/Tissue Integrity  Goal: Skin integrity remains intact  Description: 1.  Monitor for areas of redness and/or skin breakdown  2.  Assess vascular access sites hourly  3.  Every 4-6 hours minimum:  Change oxygen saturation probe site  4.  Every 4-6 hours:  If on nasal continuous positive airway pressure, respiratory therapy assess nares and determine need for appliance change or resting period  Outcome: Progressing     Problem: Confusion  Goal: Confusion, delirium, dementia, or psychosis is improved or at baseline  Description: INTERVENTIONS:  1. Assess for possible contributors to thought disturbance, including medications, impaired vision or hearing, underlying metabolic abnormalities, dehydration, psychiatric diagnoses, and notify attending LIP  2. [place] high risk fall precautions, as indicated  3. Provide frequent short contacts to provide reality reorientation, refocusing and direction  4. Decrease environmental stimuli, including noise as appropriate  5. Monitor and intervene to maintain adequate nutrition, hydration, elimination, sleep and activity  6. If unable to ensure safety without constant attention 
obtain sitter and review sitter guidelines with assigned personnel  7. Initiate Psychosocial CNS and Spiritual Care consult, as indicated  Outcome: Progressing     
Progressing  3/6/2025 0353 by Lilliana Gann RN  Outcome: Progressing  Goal: Hemodynamic stability and optimal renal function maintained  3/6/2025 1055 by Angelina Dorman RN  Outcome: Progressing  3/6/2025 0353 by Lilliana Gann RN  Outcome: Progressing     Problem: Hematologic - Adult  Goal: Maintains hematologic stability  3/6/2025 1055 by Angelina Dorman RN  Outcome: Progressing  3/6/2025 0353 by Lilliana Gann RN  Outcome: Progressing

## 2025-03-06 NOTE — DISCHARGE SUMMARY
V2.0  Discharge Summary    Name:  Ran Solis /Age/Sex: 1932 (89 y.o. male)   Admit Date: 3/4/2025  Discharge Date: 3/6/25    MRN & CSN:  [id number] & [id number] Encounter Date and Time 3/6/25 12:06 PM EST    Attending:  Aiden Enciso MD Discharging Provider: Aiden Enciso MD       Hospital Course:     Brief HPI: Ran Solis is a 89 y.o. male with history of hypertension, CKD stage III, BPH, mood disorder presented to the emergency room with confusion, weakness and falls.  The patient was apparently found on the floor today by his daughter who called EMS, he was confused..  He has apparently been feeling generally weak and had a cough..  He had blood work in the ED and was sent home, he apparently had another fall after negative home.  He is alert and oriented self  Workup in the ED was significant for UTI  Rapid COVID was negative, rapid flu AMB are pending, chest x-ray was clear    Brief Problem Based Course:     -Acute metabolic encephalopathy evidenced by increased confusion--  -Suspected UTI--urine note reflexed to culture..  Treated empirically with Rocephin, changed to Levaquin once Cipro daily  -Multiple falls  -Generalized weakness  -Dehydration-  -severe oropharyngeal dysphagia--speech therapist recommended n.p.o., based on patient goals of care, comfort feeds/dysphagia diet ordered    Goals of care..  CODE STATUS was updated DNR CC, palliative care consulted and patient was transition to hospice at time of discharge          -CKD stage III-creatinine at baseline  -Elevated troponins-unclear significance, no acute ischemic EKG changes or chest pain-  -Primary hypertension.  BP stable-on Coreg, Aldactone, hydralazine  -BPH-on finasteride  -Anxiety/depression--on Zoloft, buspirone         The patient expressed appropriate understanding of, and agreement with the discharge recommendations, medications, and plan.     Consults this admission:  IP CONSULT TO SOCIAL WORK  IP CONSULT TO

## 2025-03-06 NOTE — CARE COORDINATION
Case Management Assessment  Initial Evaluation    Date/Time of Evaluation: 3/5/2025 3:45 PM  Assessment Completed by: JASMIN Flores    If patient is discharged prior to next notation, then this note serves as note for discharge by case management.    Patient Name: Ran Solis                   YOB: 1932  Diagnosis: Dehydration [E86.0]  Confusion [R41.0]  Altered mental status [R41.82]  Generalized weakness [R53.1]  Elevated troponin [R79.89]  Elevated brain natriuretic peptide (BNP) level [R79.89]  Multiple falls [R29.6]  Sepsis, due to unspecified organism, unspecified whether acute organ dysfunction present (HCC) [A41.9]                   Date / Time: 3/4/2025  4:04 PM    Patient Admission Status: Inpatient   Readmission Risk (Low < 19, Mod (19-27), High > 27): Readmission Risk Score: 16.2    Current PCP: Umberto Glass MD  PCP verified by CM? (P) Yes    Chart Reviewed: Yes      History Provided by: (P) Child/Family  Patient Orientation: (P) Alert and Oriented, Person    Patient Cognition: (P) Short Term Memory Deficit    Hospitalization in the last 30 days (Readmission):  No    If yes, Readmission Assessment in CM Navigator will be completed.    Advance Directives:      Code Status: DNR-CC   Patient's Primary Decision Maker is: (P) Legal Next of Kin    Primary Decision Maker: Petty Solis - Child - [phone]    Discharge Planning:    Patient lives with: (P) Children Type of Home: (P) House  Primary Care Giver: (P) Family  Patient Support Systems include: (P) Children   Current Financial resources: (P) None  Current community resources: (P) None  Current services prior to admission: (P) Durable Medical Equipment            Current DME: (P) Walker            Type of Home Care services:  (P) None    ADLS  Prior functional level: (P) Independent in ADLs/IADLs, Mobility  Current functional level: (P) Independent in ADLs/IADLs, Mobility, Assistance with the following:, Bathing, 
Discharge Planning:  Approval received by [facility] for [facility] inpt unit at [facility] once a bed is available.   PLAN: [facility] Inpt Unit once a bed becomes available.   JASMIN Gao  [phone]  Electronically signed by JASMIN Temple on 3/6/2025 at 12:11 PM      
Discharge Planning:  Per Rosa (Palliative Care RN), a referral has been initiated with Hospice [facility].  SW will f/u with Argenis with [facility].   JASMIN Gao  [phone]  Electronically signed by JASMIN Temple on 3/6/2025 at 10:39 AM    
Transition of Care is related to the following treatment goals of Dehydration [E86.0]  Confusion [R41.0]  Altered mental status [R41.82]  Generalized weakness [R53.1]  Elevated troponin [R79.89]  Elevated brain natriuretic peptide (BNP) level [R79.89]  Multiple falls [R29.6]  Sepsis, due to unspecified organism, unspecified whether acute organ dysfunction present (HCC) [A41.9]    The Patient and/or patient representative Ran and his family were provided with a choice of provider and agrees with the discharge plan Yes    Freedom of choice list was provided with basic dialogue that supports the patient's individualized plan of care/goals and shares the quality data associated with the providers. Yes    JASMIN Temple  [facility]   Case Management Department  Ph: [phone]  Fax: [phone]  Electronically signed by JASMIN Temple on 3/6/2025 at 3:51 PM

## 2025-03-06 NOTE — DISCHARGE INSTR - COC
Continuity of Care Form    Patient Name: Ran Solis   :  1932  MRN:  [id number]    Admit date:  3/4/2025  Discharge date:  2025    Code Status Order: DNR-CC   Advance Directives:   Advance Care Flowsheet Documentation             Admitting Physician:  Aiden Enciso MD  PCP: Umberto Glass MD    Discharging Nurse: Angelina RN  Discharging Hospital Unit/Room#: I3G-6511/4250-01  Discharging Unit Phone Number: [phone]    Emergency Contact:   Extended Emergency Contact Information  Primary Emergency Contact: Petty Solis  Address: [address] [place] [address]            [place] [address] [facility]  Home Phone: [phone]  Mobile Phone: [phone]  Relation: Child    Past Surgical History:  Past Surgical History:   Procedure Laterality Date    HERNIA REPAIR      TONSILLECTOMY         Immunization History:   Immunization History   Administered Date(s) Administered    COVID-19, MODERNA, (age 12y+), IM, 50mcg/0.5mL 10/05/2023    COVID-19, PFIZER Bivalent, DO NOT Dilute, (age 12y+), IM, 30 mcg/0.3 mL 09/15/2022    COVID-19, PFIZER GRAY top, DO NOT Dilute, (age 12 y+), IM, 30 mcg/0.3 mL 2022    COVID-19, PFIZER PURPLE top, DILUTE for use, (age 12 y+), 30mcg/0.3mL 2021, 2021, 10/29/2021    COVID-19, PFIZER, (age 12y+), IM, 30mcg/0.3mL 10/13/2024       Active Problems:  Patient Active Problem List   Diagnosis Code    Cellulitis L03.90    Near syncope R55    Left leg weakness R29.898    HTN (hypertension) I10    Pressure ulcer of left ischium, 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

## 2025-03-07 NOTE — PROGRESS NOTES
Physical Therapy  Daily Treatment Attempt and Discharge    25    Name: Ran Solis   : 1932    MRN: [id number]    PT follow-up attempt. Patient unable to be seen by PT due to moving forward with hospice. No acute therapy needs at this time. PT signing off.     Electronically signed by Sonny Bryant PT on 3/6/2025 at 1:31 PM    
0020: Daughter Petty bedside calls out stating that patient is restless and agitated throwing pillows in the room. Message sent to on call provider informing of behaviors and that patient has hospice consulted to see patient tomorrow.  New orders placed at this time.     Electronically signed by Funmi Larry RN on 3/6/2025 at 12:37 AM    Primary nurse observes patient in virtual safety room. Patient's daughter is viewed to continue to adjust patient and patient appears to be agitated. Primary nurse speaks with daughter. Daughter voices concern that patient R arm seems to be more swollen and she has concerns of infection. Primary nurse informs daughter that we will reassess patient to address her concerns.     Electronically signed by Funmi Larry RN on 3/6/2025 at 12:42 AM    0335:Daughter calls out with concerns about father being dehydrated. States father is agitated and restless. Patient is in bed and repositioned at this time. Patients daughter Petty verbalizes many concerns about father's care. Charge nurse Heather is notified.  is present to take to daughter at this time.    Electronically signed by Funmi Larry RN on 3/6/2025 at 5:26 AM            
4 Eyes Skin Assessment     NAME:  Ran Solis  YOB: 1932  MEDICAL RECORD NUMBER:  [id number]    The patient is being assessed for  Admission    I agree that at least one RN has performed a thorough Head to Toe Skin Assessment on the patient. ALL assessment sites listed below have been assessed.      Areas assessed by both nurses:    Head, Face, Ears, Shoulders, Back, Chest, Arms, Elbows, Hands, Sacrum. Buttock, Coccyx, Ischium, Legs. Feet and Heels, and Under Medical Devices         Does the Patient have a Wound? No       Yoan Prevention initiated by RN: Yes  Wound Care Orders initiated by RN: No    Pressure Injury (Stage 3,4, Unstageable, DTI, NWPT, and Complex wounds) if present, place Wound referral order by RN under : No    New Ostomies, if present place, Ostomy referral order under : No     Nurse 1 eSignature: Electronically signed by Lilliana Gann RN on 3/5/25 at 4:35 AM EST    **SHARE this note so that the co-signing nurse can place an eSignature**    Nurse 2 eSignature: Electronically signed by Funmi Larry RN on 3/5/25 at 5:04 AM EST    
EMS presents to transfer Patient to inpatient hospice facility. Patient is alert x1, no changes from previous assessment. Discharge packet provided. Belongings provided to daughter (Petty). Patient has no IV access. Daughter was provided directions to facility per request. All questions answered at this time. Patient is safely transferred to [facility].     Electronically signed by Lilliana Gann RN on 3/6/2025 at 8:40 PM     
[facility]    Met with daughter and patient  to discuss hospice philosophy and services. Daughter started conversation very angry and unhappy with hospice in the past. This RN allowed her to vent her frustrations about feeling like she wasn't given all the information needed when placing her mother in the inpatient unit. This RN explained in detail about hospice services in the home and inpatient unit. Levels of care were discussed and how the inpatient unit can be utilized for acute symptoms and respite care, Daughter disclosed that she would need to clean up house and make room for hospital bed. This RN offered name and number of recourses that could help with cleaning up the home that she described as close to hoarding. Also gave number for [employer] for additional services in home that they may qualify for as well as suggesting applying for medicaid in case patient would need long term care. [facility] qualifies for inpatient unit for agitation and has been approved by HOC director however there areno beds currently available at [facility]. Hospice nurse will continue to follow patient and update if bed becomes available. Thank you for allowing me to participate in Ran's care. Anjali North CM updated.     Addendum- [facility] bed available [facility] will  at [address] at  for transport.      Argenis Matthews RN  [phone]  
Occupational Therapy Hold  Ran Solis    Pt now with hospice consult. Will await final decision before proceeding with therapy.    Electronically signed by Perri Murrieta OT on 3/6/25 at 11:40 AM EST    
Patient arrives to 4250 via transport ED. Patient is alert and oriented x 2-3, confused. Patient vss. Patient is on RA. Patient is assisted to bed at this time. Patient's bed is locked, in lowest position and armed. Call light is in reach. Patient is assessed. Patient is unable to participate in answering all admission questions appropriately and is confused and agitated. Patient is high risk for fall, fall precautions in place. Patient is placed on tele cam monitoring for safety. Patient's daughter at bedside verbalizes that she is his power of  and answers all admission questions on behalf of the patient. Patient's daughter reports that the patient's living will hasn't been changed since 2015 however she was unable to confirm the patient's goal of care whether a DNR-CC or remains Full code at this time, MD notified. Patient's daughter verbalizes patient to be a DNR-CC. 4 eyes exam is completed. Patient oriented to room. Patient displayed more confusion and agitation. MD notified. Patient and daughter verbalizes no further needs at this time.   
SLP NOTE    Chart reviewed and patient discussed with RN. Plan confirmed for discharge to hospice care at this time. Discontinue skilled ST d/t current goals/plan.    Daughter met at bedside. Verbalizes understanding of plan and appreciation for care.    Thank you.  Opal Starks MA, CCC-SLP, #[id number]  Speech-Language Pathologist  Portable phone: [phone]   
Unsteady gait throughout.     Balance  Posture: Fair  Sitting - Static: Fair  Sitting - Dynamic: Fair;-  Standing - Static: Fair;- (@RW)  Standing - Dynamic: Poor;+ (@RW)        AM-PAC - Mobility  AM-PAC Basic Mobility - Inpatient   How much help is needed turning from your back to your side while in a flat bed without using bedrails?: A Little  How much help is needed moving from lying on your back to sitting on the side of a flat bed without using bedrails?: A Lot  How much help is needed moving to and from a bed to a chair?: A Little  How much help is needed standing up from a chair using your arms?: A Lot  How much help is needed walking in hospital room?: A Little  How much help is needed climbing 3-5 steps with a railing?: Total  AM-PAC Inpatient Mobility Raw Score : 14  AM-PAC Inpatient T-Scale Score : 38.1  Mobility Inpatient CMS 0-100% Score: 61.29  Mobility Inpatient CMS G-Code Modifier : CL        Goals  Short Term Goals  Time Frame for Short Term Goals: by acute discharge  Short Term Goal 1: bed mobility SBA  Short Term Goal 2: sit<>Stand SBA  Short Term Goal 3: ambulate > 40' with RW and CGA  Patient Goals   Patient Goals : none stated       Education  Patient Education  Education Given To: Patient  Education Provided: Role of Therapy;Plan of Care  Education Method: Verbal  Barriers to Learning: Cognition  Education Outcome: Verbalized understanding;Continued education needed      Therapy Time   Individual Concurrent Group Co-treatment   Time In 0745         Time Out 0810         Minutes 25         Timed Code Treatment Minutes: 15 Minutes       Sonny Bryant, PT         
and Recommendations   Ran Solis is a 89 y.o. 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  BP stable-on Coreg, Aldactone, hydralazine  -BPH-on finasteride  -Anxiety/depression--on Zoloft, buspirone    Disposition..  Plan on discharge to ECF in 24 hours if approved      Diet ADULT DIET; Regular; Low Potassium (Less than 3000 mg/day); Mildly Thick (Nectar)     DVT Prophylaxis [x] Lovenox, []  Heparin, [] SCDs, [] Ambulation,  [] Eliquis, [] Xarelto  [] Coumadin   Code Status Full Code   Disposition From:   Expected Disposition:   Estimated Date of Discharge:   Patient requires continued admission due to    Surrogate Decision Maker/ POA       Personally reviewed Lab Studies and Imaging        Medical Decision Making:  The following items were considered in medical decision making:  Discussion of patient care with other providers  Reviewed clinical lab tests  Reviewed radiology tests  Reviewed other diagnostic tests/interventions  Independent review of radiologic images  Microbiology cultures and other micro tests reviewed        Electronically signed by Aiden Enciso MD on 3/5/2025 at 1:32 PM  Comment: Please note this report has been produced using speech recognition software and may contain errors related to that system including errors in grammar, punctuation, and spelling, as well as words and phrases that may be inappropriate. If there are any questions or concerns, please feel free to contact the dictating provider for clarification.   
weakness/fatigue    Consistencies Presented: Regular texture, Soft and Bite-sized texture, Puree texture, Mildly/Nectar Thick liquids  via cup and via straw, Thin liquids  via cup and via straw  PO Trial Feeding Assistance: Self-feed    Patient Positioning: Fully upright, In chair    Dentition: Adequate    Baseline Vocal Quality:  Weak    Volitional Cough: Weak    Volitional Swallow:  Delayed, weak    Oral Mechanism Exam:    Oral Hygiene: Moist, Clean  Mandible: Reduced  Labial: Reduced  Lingual: Reduced  Palate: Reduced  Gag: Intact    Oral Phase:  Impaired   Reduced mastication resulting in prolonged bolus formation with textured solids; does not appear to be excessively labored with meal  Reduced lingual coordination and/or strength resulting in reduced bolus control , prolonged oral transit, suspected premature bolus loss to the pharynx     Pharyngeal Phase: Clinical signs/symptoms with concerns for  cough with thin liquids  Aforementioned symptoms contribute to concerns for delayed swallow initiation, reduced laryngeal ROM, reduced pharyngeal peristalsis    Pt reporting s/s of concern for Esophageal problems:   Belching/burping , h/o GERD      If patient discharges prior to next visit, this note will serve as discharge.     Timed Code Minutes: 0  Total Treatment Minutes:  30    Electronically signed by:    Opal Starks MA, CCC-SLP, #[id number]  Speech-Language Pathologist  Portable phone: [phone]  03/05/25  8:46 AM  
needed for putting on and taking off regular upper body clothing?: A Little  How much help is needed for taking care of personal grooming?: A Little  How much help for eating meals?: None  AM-PAC Inpatient Daily Activity Raw Score: 16  AM-PAC Inpatient ADL T-Scale Score : 35.96  ADL Inpatient CMS 0-100% Score: 53.32  ADL Inpatient CMS G-Code Modifier : CK      Goals  Short Term Goals  Time Frame for Short Term Goals: Prior to d/c  Short Term Goal 1: Pt will complete ADL transfers SBA  Short Term Goal 2: Pt will toilet SBA  Short Term Goal 3: Pt will bathe min A  Short Term Goal 4: Pt will groom in stance at sink SBA  Long Term Goals  Time Frame for Long Term Goals : LTG=STG  Patient Goals   Patient goals : not stated      Therapy Time   Individual Concurrent Group Co-treatment   Time In 0745         Time Out 0810         Minutes 25         Timed Code Treatment Minutes: 10 Minutes       Perri Murrieta OTR/L [id number]

## 2025-03-08 LAB
BACTERIA BLD CULT ORG #2: NORMAL
BACTERIA BLD CULT: NORMAL